# Patient Record
Sex: MALE | Race: WHITE | NOT HISPANIC OR LATINO | Employment: PART TIME | URBAN - METROPOLITAN AREA
[De-identification: names, ages, dates, MRNs, and addresses within clinical notes are randomized per-mention and may not be internally consistent; named-entity substitution may affect disease eponyms.]

---

## 2019-05-06 ENCOUNTER — HOSPITAL ENCOUNTER (INPATIENT)
Facility: CLINIC | Age: 54
LOS: 2 days | Discharge: HOME | DRG: 247 | End: 2019-05-08
Attending: EMERGENCY MEDICINE | Admitting: INTERNAL MEDICINE
Payer: COMMERCIAL

## 2019-05-06 ENCOUNTER — SURGERY (OUTPATIENT)
Age: 54
End: 2019-05-06
Payer: COMMERCIAL

## 2019-05-06 DIAGNOSIS — I21.3 STEMI (ST ELEVATION MYOCARDIAL INFARCTION) (H): ICD-10-CM

## 2019-05-06 DIAGNOSIS — Z98.61 POSTSURGICAL PERCUTANEOUS TRANSLUMINAL CORONARY ANGIOPLASTY STATUS: Primary | ICD-10-CM

## 2019-05-06 DIAGNOSIS — I25.10 CORONARY ARTERY DISEASE INVOLVING NATIVE CORONARY ARTERY OF NATIVE HEART WITHOUT ANGINA PECTORIS: ICD-10-CM

## 2019-05-06 LAB
ANION GAP SERPL CALCULATED.3IONS-SCNC: 9 MMOL/L (ref 3–14)
APTT PPP: 26 SEC (ref 22–37)
BASOPHILS # BLD AUTO: 0.1 10E9/L (ref 0–0.2)
BASOPHILS NFR BLD AUTO: 0.5 %
BUN SERPL-MCNC: 12 MG/DL (ref 7–30)
CALCIUM SERPL-MCNC: 9.3 MG/DL (ref 8.5–10.1)
CHLORIDE SERPL-SCNC: 106 MMOL/L (ref 94–109)
CHOLEST SERPL-MCNC: 249 MG/DL
CO2 SERPL-SCNC: 26 MMOL/L (ref 20–32)
CREAT SERPL-MCNC: 1.17 MG/DL (ref 0.66–1.25)
DIFFERENTIAL METHOD BLD: ABNORMAL
EOSINOPHIL # BLD AUTO: 0.2 10E9/L (ref 0–0.7)
EOSINOPHIL NFR BLD AUTO: 2.1 %
ERYTHROCYTE [DISTWIDTH] IN BLOOD BY AUTOMATED COUNT: 12.2 % (ref 10–15)
GFR SERPL CREATININE-BSD FRML MDRD: 71 ML/MIN/{1.73_M2}
GLUCOSE SERPL-MCNC: 129 MG/DL (ref 70–99)
HCT VFR BLD AUTO: 47.3 % (ref 40–53)
HDLC SERPL-MCNC: 35 MG/DL
HGB BLD-MCNC: 16.8 G/DL (ref 13.3–17.7)
IMM GRANULOCYTES # BLD: 0 10E9/L (ref 0–0.4)
IMM GRANULOCYTES NFR BLD: 0.3 %
INR PPP: 0.93 (ref 0.86–1.14)
INTERPRETATION ECG - MUSE: NORMAL
LDLC SERPL CALC-MCNC: 141 MG/DL
LYMPHOCYTES # BLD AUTO: 4.9 10E9/L (ref 0.8–5.3)
LYMPHOCYTES NFR BLD AUTO: 42.9 %
MCH RBC QN AUTO: 33.5 PG (ref 26.5–33)
MCHC RBC AUTO-ENTMCNC: 35.5 G/DL (ref 31.5–36.5)
MCV RBC AUTO: 94 FL (ref 78–100)
MONOCYTES # BLD AUTO: 0.8 10E9/L (ref 0–1.3)
MONOCYTES NFR BLD AUTO: 7.3 %
NEUTROPHILS # BLD AUTO: 5.4 10E9/L (ref 1.6–8.3)
NEUTROPHILS NFR BLD AUTO: 46.9 %
NONHDLC SERPL-MCNC: 214 MG/DL
NRBC # BLD AUTO: 0 10*3/UL
NRBC BLD AUTO-RTO: 0 /100
PLATELET # BLD AUTO: 230 10E9/L (ref 150–450)
POTASSIUM SERPL-SCNC: 3.6 MMOL/L (ref 3.4–5.3)
RBC # BLD AUTO: 5.02 10E12/L (ref 4.4–5.9)
SODIUM SERPL-SCNC: 141 MMOL/L (ref 133–144)
TRIGL SERPL-MCNC: 363 MG/DL
TROPONIN I SERPL-MCNC: 2.17 UG/L (ref 0–0.04)
TROPONIN I SERPL-MCNC: <0.015 UG/L (ref 0–0.04)
WBC # BLD AUTO: 11.5 10E9/L (ref 4–11)

## 2019-05-06 PROCEDURE — C1874 STENT, COATED/COV W/DEL SYS: HCPCS | Performed by: INTERNAL MEDICINE

## 2019-05-06 PROCEDURE — 25000128 H RX IP 250 OP 636: Performed by: INTERNAL MEDICINE

## 2019-05-06 PROCEDURE — 25000125 ZZHC RX 250: Performed by: INTERNAL MEDICINE

## 2019-05-06 PROCEDURE — 85025 COMPLETE CBC W/AUTO DIFF WBC: CPT | Performed by: EMERGENCY MEDICINE

## 2019-05-06 PROCEDURE — 99153 MOD SED SAME PHYS/QHP EA: CPT | Performed by: INTERNAL MEDICINE

## 2019-05-06 PROCEDURE — 80048 BASIC METABOLIC PNL TOTAL CA: CPT | Performed by: EMERGENCY MEDICINE

## 2019-05-06 PROCEDURE — 25800030 ZZH RX IP 258 OP 636: Performed by: STUDENT IN AN ORGANIZED HEALTH CARE EDUCATION/TRAINING PROGRAM

## 2019-05-06 PROCEDURE — 25000132 ZZH RX MED GY IP 250 OP 250 PS 637: Performed by: PHYSICIAN ASSISTANT

## 2019-05-06 PROCEDURE — 84484 ASSAY OF TROPONIN QUANT: CPT | Performed by: EMERGENCY MEDICINE

## 2019-05-06 PROCEDURE — 93572 IV DOP VEL&/PRESS C FLO EA: CPT | Mod: 26 | Performed by: INTERNAL MEDICINE

## 2019-05-06 PROCEDURE — 93458 L HRT ARTERY/VENTRICLE ANGIO: CPT | Performed by: INTERNAL MEDICINE

## 2019-05-06 PROCEDURE — C9606 PERC D-E COR REVASC W AMI S: HCPCS | Mod: RC | Performed by: INTERNAL MEDICINE

## 2019-05-06 PROCEDURE — 99232 SBSQ HOSP IP/OBS MODERATE 35: CPT | Performed by: INTERNAL MEDICINE

## 2019-05-06 PROCEDURE — 99152 MOD SED SAME PHYS/QHP 5/>YRS: CPT | Performed by: INTERNAL MEDICINE

## 2019-05-06 PROCEDURE — 85730 THROMBOPLASTIN TIME PARTIAL: CPT | Performed by: EMERGENCY MEDICINE

## 2019-05-06 PROCEDURE — 25000132 ZZH RX MED GY IP 250 OP 250 PS 637: Performed by: EMERGENCY MEDICINE

## 2019-05-06 PROCEDURE — 99291 CRITICAL CARE FIRST HOUR: CPT | Mod: 25 | Performed by: INTERNAL MEDICINE

## 2019-05-06 PROCEDURE — 80061 LIPID PANEL: CPT | Performed by: EMERGENCY MEDICINE

## 2019-05-06 PROCEDURE — 85347 COAGULATION TIME ACTIVATED: CPT

## 2019-05-06 PROCEDURE — 25000132 ZZH RX MED GY IP 250 OP 250 PS 637: Performed by: INTERNAL MEDICINE

## 2019-05-06 PROCEDURE — 93454 CORONARY ARTERY ANGIO S&I: CPT | Performed by: INTERNAL MEDICINE

## 2019-05-06 PROCEDURE — 93005 ELECTROCARDIOGRAM TRACING: CPT

## 2019-05-06 PROCEDURE — 4A023N8 MEASUREMENT OF CARDIAC SAMPLING AND PRESSURE, BILATERAL, PERCUTANEOUS APPROACH: ICD-10-PCS | Performed by: INTERNAL MEDICINE

## 2019-05-06 PROCEDURE — 36415 COLL VENOUS BLD VENIPUNCTURE: CPT | Performed by: INTERNAL MEDICINE

## 2019-05-06 PROCEDURE — 99232 SBSQ HOSP IP/OBS MODERATE 35: CPT | Performed by: PHYSICIAN ASSISTANT

## 2019-05-06 PROCEDURE — C1769 GUIDE WIRE: HCPCS | Performed by: INTERNAL MEDICINE

## 2019-05-06 PROCEDURE — 25000132 ZZH RX MED GY IP 250 OP 250 PS 637: Performed by: STUDENT IN AN ORGANIZED HEALTH CARE EDUCATION/TRAINING PROGRAM

## 2019-05-06 PROCEDURE — 92928 PRQ TCAT PLMT NTRAC ST 1 LES: CPT | Mod: LD | Performed by: INTERNAL MEDICINE

## 2019-05-06 PROCEDURE — C1725 CATH, TRANSLUMIN NON-LASER: HCPCS | Performed by: INTERNAL MEDICINE

## 2019-05-06 PROCEDURE — 25000132 ZZH RX MED GY IP 250 OP 250 PS 637

## 2019-05-06 PROCEDURE — 85610 PROTHROMBIN TIME: CPT | Performed by: EMERGENCY MEDICINE

## 2019-05-06 PROCEDURE — 93571 IV DOP VEL&/PRESS C FLO 1ST: CPT | Mod: 26 | Performed by: INTERNAL MEDICINE

## 2019-05-06 PROCEDURE — 027035Z DILATION OF CORONARY ARTERY, ONE ARTERY WITH TWO DRUG-ELUTING INTRALUMINAL DEVICES, PERCUTANEOUS APPROACH: ICD-10-PCS | Performed by: INTERNAL MEDICINE

## 2019-05-06 PROCEDURE — C1887 CATHETER, GUIDING: HCPCS | Performed by: INTERNAL MEDICINE

## 2019-05-06 PROCEDURE — 96374 THER/PROPH/DIAG INJ IV PUSH: CPT

## 2019-05-06 PROCEDURE — 84484 ASSAY OF TROPONIN QUANT: CPT | Performed by: INTERNAL MEDICINE

## 2019-05-06 PROCEDURE — B2111ZZ FLUOROSCOPY OF MULTIPLE CORONARY ARTERIES USING LOW OSMOLAR CONTRAST: ICD-10-PCS | Performed by: INTERNAL MEDICINE

## 2019-05-06 PROCEDURE — C1894 INTRO/SHEATH, NON-LASER: HCPCS | Performed by: INTERNAL MEDICINE

## 2019-05-06 PROCEDURE — 99291 CRITICAL CARE FIRST HOUR: CPT

## 2019-05-06 PROCEDURE — C9600 PERC DRUG-EL COR STENT SING: HCPCS | Performed by: INTERNAL MEDICINE

## 2019-05-06 PROCEDURE — 21000001 ZZH R&B HEART CARE

## 2019-05-06 PROCEDURE — 99207 ZZC CONSULT E&M CHANGED TO SUBSEQUENT LEVEL: CPT | Performed by: PHYSICIAN ASSISTANT

## 2019-05-06 PROCEDURE — 99152 MOD SED SAME PHYS/QHP 5/>YRS: CPT | Mod: GC | Performed by: INTERNAL MEDICINE

## 2019-05-06 PROCEDURE — 27210794 ZZH OR GENERAL SUPPLY STERILE: Performed by: INTERNAL MEDICINE

## 2019-05-06 PROCEDURE — 25000128 H RX IP 250 OP 636: Performed by: EMERGENCY MEDICINE

## 2019-05-06 PROCEDURE — 93010 ELECTROCARDIOGRAM REPORT: CPT | Performed by: INTERNAL MEDICINE

## 2019-05-06 DEVICE — STENT SYNERGY DRUG ELUTING 3.00X12MM  H7493926012300: Type: IMPLANTABLE DEVICE | Status: FUNCTIONAL

## 2019-05-06 DEVICE — STENT SYNERGY DRUG ELUTING 3.00X20MM  H7493926020300: Type: IMPLANTABLE DEVICE | Status: FUNCTIONAL

## 2019-05-06 RX ORDER — NITROGLYCERIN 0.4 MG/1
0.4 TABLET SUBLINGUAL EVERY 5 MIN PRN
Status: DISCONTINUED | OUTPATIENT
Start: 2019-05-06 | End: 2019-05-06

## 2019-05-06 RX ORDER — NITROGLYCERIN 0.4 MG/1
0.4 TABLET SUBLINGUAL EVERY 5 MIN PRN
Status: DISCONTINUED | OUTPATIENT
Start: 2019-05-06 | End: 2019-05-08 | Stop reason: HOSPADM

## 2019-05-06 RX ORDER — FENTANYL CITRATE 50 UG/ML
25-50 INJECTION, SOLUTION INTRAMUSCULAR; INTRAVENOUS
Status: ACTIVE | OUTPATIENT
Start: 2019-05-06 | End: 2019-05-07

## 2019-05-06 RX ORDER — ASPIRIN 81 MG/1
81 TABLET ORAL DAILY
Status: DISCONTINUED | OUTPATIENT
Start: 2019-05-07 | End: 2019-05-08 | Stop reason: HOSPADM

## 2019-05-06 RX ORDER — METOPROLOL TARTRATE 1 MG/ML
5 INJECTION, SOLUTION INTRAVENOUS EVERY 10 MIN PRN
Status: COMPLETED | OUTPATIENT
Start: 2019-05-06 | End: 2019-05-06

## 2019-05-06 RX ORDER — LIDOCAINE 40 MG/G
CREAM TOPICAL
Status: CANCELLED | OUTPATIENT
Start: 2019-05-06

## 2019-05-06 RX ORDER — LISINOPRIL 5 MG/1
5 TABLET ORAL DAILY
Status: DISCONTINUED | OUTPATIENT
Start: 2019-05-06 | End: 2019-05-08 | Stop reason: HOSPADM

## 2019-05-06 RX ORDER — SODIUM CHLORIDE 9 MG/ML
INJECTION, SOLUTION INTRAVENOUS CONTINUOUS
Status: CANCELLED | OUTPATIENT
Start: 2019-05-06

## 2019-05-06 RX ORDER — AMLODIPINE BESYLATE 5 MG/1
5 TABLET ORAL
Status: DISCONTINUED | OUTPATIENT
Start: 2019-05-06 | End: 2019-05-08 | Stop reason: HOSPADM

## 2019-05-06 RX ORDER — ASPIRIN 325 MG
TABLET ORAL
Status: COMPLETED
Start: 2019-05-06 | End: 2019-05-06

## 2019-05-06 RX ORDER — SODIUM CHLORIDE 9 MG/ML
INJECTION, SOLUTION INTRAVENOUS CONTINUOUS
Status: ACTIVE | OUTPATIENT
Start: 2019-05-06 | End: 2019-05-06

## 2019-05-06 RX ORDER — VERAPAMIL HYDROCHLORIDE 2.5 MG/ML
INJECTION, SOLUTION INTRAVENOUS
Status: DISCONTINUED | OUTPATIENT
Start: 2019-05-06 | End: 2019-05-06 | Stop reason: HOSPADM

## 2019-05-06 RX ORDER — NALOXONE HYDROCHLORIDE 0.4 MG/ML
.2-.4 INJECTION, SOLUTION INTRAMUSCULAR; INTRAVENOUS; SUBCUTANEOUS
Status: ACTIVE | OUTPATIENT
Start: 2019-05-06 | End: 2019-05-07

## 2019-05-06 RX ORDER — ASPIRIN 81 MG/1
324 TABLET, CHEWABLE ORAL ONCE
Status: COMPLETED | OUTPATIENT
Start: 2019-05-06 | End: 2019-05-06

## 2019-05-06 RX ORDER — NITROGLYCERIN 0.4 MG/1
TABLET SUBLINGUAL
Status: COMPLETED
Start: 2019-05-06 | End: 2019-05-06

## 2019-05-06 RX ORDER — METOPROLOL TARTRATE 25 MG/1
25 TABLET, FILM COATED ORAL 2 TIMES DAILY
Status: DISCONTINUED | OUTPATIENT
Start: 2019-05-06 | End: 2019-05-08 | Stop reason: HOSPADM

## 2019-05-06 RX ORDER — NALOXONE HYDROCHLORIDE 0.4 MG/ML
.1-.4 INJECTION, SOLUTION INTRAMUSCULAR; INTRAVENOUS; SUBCUTANEOUS
Status: DISCONTINUED | OUTPATIENT
Start: 2019-05-06 | End: 2019-05-08 | Stop reason: HOSPADM

## 2019-05-06 RX ORDER — LORAZEPAM 2 MG/ML
0.5 INJECTION INTRAMUSCULAR
Status: CANCELLED | OUTPATIENT
Start: 2019-05-06

## 2019-05-06 RX ORDER — MORPHINE SULFATE 2 MG/ML
0.5 INJECTION, SOLUTION INTRAMUSCULAR; INTRAVENOUS ONCE
Status: DISCONTINUED | OUTPATIENT
Start: 2019-05-06 | End: 2019-05-08 | Stop reason: HOSPADM

## 2019-05-06 RX ORDER — ATROPINE SULFATE 0.1 MG/ML
0.5 INJECTION INTRAVENOUS EVERY 5 MIN PRN
Status: ACTIVE | OUTPATIENT
Start: 2019-05-06 | End: 2019-05-07

## 2019-05-06 RX ORDER — LORAZEPAM 0.5 MG/1
0.5 TABLET ORAL
Status: CANCELLED | OUTPATIENT
Start: 2019-05-06

## 2019-05-06 RX ORDER — FENTANYL CITRATE 50 UG/ML
INJECTION, SOLUTION INTRAMUSCULAR; INTRAVENOUS
Status: DISCONTINUED | OUTPATIENT
Start: 2019-05-06 | End: 2019-05-06 | Stop reason: HOSPADM

## 2019-05-06 RX ORDER — ZOLPIDEM TARTRATE 5 MG/1
5 TABLET ORAL
Status: DISCONTINUED | OUTPATIENT
Start: 2019-05-06 | End: 2019-05-08 | Stop reason: HOSPADM

## 2019-05-06 RX ORDER — HEPARIN SODIUM 1000 [USP'U]/ML
INJECTION, SOLUTION INTRAVENOUS; SUBCUTANEOUS
Status: DISCONTINUED | OUTPATIENT
Start: 2019-05-06 | End: 2019-05-06 | Stop reason: HOSPADM

## 2019-05-06 RX ORDER — POTASSIUM CHLORIDE 1500 MG/1
20 TABLET, EXTENDED RELEASE ORAL
Status: CANCELLED | OUTPATIENT
Start: 2019-05-06

## 2019-05-06 RX ORDER — FLUMAZENIL 0.1 MG/ML
0.2 INJECTION, SOLUTION INTRAVENOUS
Status: ACTIVE | OUTPATIENT
Start: 2019-05-06 | End: 2019-05-07

## 2019-05-06 RX ORDER — ATORVASTATIN CALCIUM 40 MG/1
40 TABLET, FILM COATED ORAL DAILY
Status: DISCONTINUED | OUTPATIENT
Start: 2019-05-06 | End: 2019-05-08 | Stop reason: HOSPADM

## 2019-05-06 RX ORDER — LISINOPRIL 10 MG/1
10 TABLET ORAL ONCE
Status: COMPLETED | OUTPATIENT
Start: 2019-05-06 | End: 2019-05-06

## 2019-05-06 RX ORDER — NITROGLYCERIN 5 MG/ML
VIAL (ML) INTRAVENOUS
Status: DISCONTINUED | OUTPATIENT
Start: 2019-05-06 | End: 2019-05-06 | Stop reason: HOSPADM

## 2019-05-06 RX ORDER — ACETAMINOPHEN 325 MG/1
325-650 TABLET ORAL EVERY 4 HOURS PRN
Status: DISCONTINUED | OUTPATIENT
Start: 2019-05-06 | End: 2019-05-08 | Stop reason: HOSPADM

## 2019-05-06 RX ORDER — HYDROCODONE BITARTRATE AND ACETAMINOPHEN 5; 325 MG/1; MG/1
1-2 TABLET ORAL EVERY 4 HOURS PRN
Status: DISCONTINUED | OUTPATIENT
Start: 2019-05-06 | End: 2019-05-08 | Stop reason: HOSPADM

## 2019-05-06 RX ADMIN — TICAGRELOR 180 MG: 90 TABLET ORAL at 15:25

## 2019-05-06 RX ADMIN — FENTANYL CITRATE 50 MCG: 50 INJECTION, SOLUTION INTRAMUSCULAR; INTRAVENOUS at 15:35

## 2019-05-06 RX ADMIN — ASPIRIN 324 MG: 81 TABLET, CHEWABLE ORAL at 15:20

## 2019-05-06 RX ADMIN — MIDAZOLAM 1 MG: 1 INJECTION INTRAMUSCULAR; INTRAVENOUS at 15:40

## 2019-05-06 RX ADMIN — HEPARIN SODIUM 3000 UNITS: 1000 INJECTION, SOLUTION INTRAVENOUS; SUBCUTANEOUS at 15:38

## 2019-05-06 RX ADMIN — VERAPAMIL HYDROCHLORIDE 2.5 MG: 2.5 INJECTION, SOLUTION INTRAVENOUS at 15:35

## 2019-05-06 RX ADMIN — NITROGLYCERIN 0.4 MG: 0.4 TABLET SUBLINGUAL at 15:22

## 2019-05-06 RX ADMIN — METOPROLOL TARTRATE 25 MG: 25 TABLET ORAL at 21:07

## 2019-05-06 RX ADMIN — ZOLPIDEM TARTRATE 5 MG: 5 TABLET, FILM COATED ORAL at 23:22

## 2019-05-06 RX ADMIN — NITROGLYCERIN 200 MCG: 5 INJECTION, SOLUTION INTRAVENOUS at 15:45

## 2019-05-06 RX ADMIN — ATORVASTATIN CALCIUM 40 MG: 40 TABLET, FILM COATED ORAL at 18:27

## 2019-05-06 RX ADMIN — FENTANYL CITRATE 50 MCG: 50 INJECTION, SOLUTION INTRAMUSCULAR; INTRAVENOUS at 15:50

## 2019-05-06 RX ADMIN — MIDAZOLAM 2 MG: 1 INJECTION INTRAMUSCULAR; INTRAVENOUS at 15:45

## 2019-05-06 RX ADMIN — LISINOPRIL 5 MG: 5 TABLET ORAL at 18:27

## 2019-05-06 RX ADMIN — MIDAZOLAM 1 MG: 1 INJECTION INTRAMUSCULAR; INTRAVENOUS at 15:35

## 2019-05-06 RX ADMIN — LIDOCAINE HYDROCHLORIDE 1 ML: 10 INJECTION, SOLUTION EPIDURAL; INFILTRATION; INTRACAUDAL; PERINEURAL at 15:35

## 2019-05-06 RX ADMIN — HEPARIN SODIUM 5500 UNITS: 1000 INJECTION, SOLUTION INTRAVENOUS; SUBCUTANEOUS at 15:24

## 2019-05-06 RX ADMIN — SODIUM CHLORIDE: 9 INJECTION, SOLUTION INTRAVENOUS at 16:22

## 2019-05-06 RX ADMIN — ASPIRIN 325 MG ORAL TABLET 324 MG: 325 PILL ORAL at 15:20

## 2019-05-06 RX ADMIN — METOPROLOL TARTRATE 5 MG: 5 INJECTION, SOLUTION INTRAVENOUS at 22:16

## 2019-05-06 RX ADMIN — HEPARIN SODIUM 4000 UNITS: 1000 INJECTION, SOLUTION INTRAVENOUS; SUBCUTANEOUS at 15:43

## 2019-05-06 RX ADMIN — METOPROLOL TARTRATE 5 MG: 5 INJECTION, SOLUTION INTRAVENOUS at 21:46

## 2019-05-06 RX ADMIN — LISINOPRIL 10 MG: 10 TABLET ORAL at 21:47

## 2019-05-06 RX ADMIN — FENTANYL CITRATE 50 MCG: 50 INJECTION, SOLUTION INTRAMUSCULAR; INTRAVENOUS at 15:40

## 2019-05-06 RX ADMIN — NITROGLYCERIN 200 MCG: 5 INJECTION, SOLUTION INTRAVENOUS at 15:35

## 2019-05-06 ASSESSMENT — ACTIVITIES OF DAILY LIVING (ADL)
TOILETING: 0-->INDEPENDENT
BATHING: 0-->INDEPENDENT
RETIRED_EATING: 0-->INDEPENDENT
SWALLOWING: 0-->SWALLOWS FOODS/LIQUIDS WITHOUT DIFFICULTY
RETIRED_COMMUNICATION: 0-->UNDERSTANDS/COMMUNICATES WITHOUT DIFFICULTY
DRESS: 0-->INDEPENDENT

## 2019-05-06 ASSESSMENT — MIFFLIN-ST. JEOR
SCORE: 1676.79
SCORE: 1670

## 2019-05-06 ASSESSMENT — ENCOUNTER SYMPTOMS: FEVER: 0

## 2019-05-06 NOTE — Clinical Note
The first balloon was inserted into the right coronary artery.Max pressure = 13 lai. Total duration = 11 seconds.

## 2019-05-06 NOTE — PHARMACY-ADMISSION MEDICATION HISTORY
Admission medication history interview status for the 5/6/2019  admission is complete. See EPIC admission navigator for prior to admission medications     Medication history source reliability:Good    Actions taken by pharmacist (provider contacted, etc):None     Additional medication history information not noted on PTA med list :None    Medication reconciliation/reorder completed by provider prior to medication history? No    Time spent in this activity: 3 min    Prior to Admission medications    Not on File

## 2019-05-06 NOTE — Clinical Note
The first balloon was inserted into the right coronary artery.Max pressure = 20 lai. Total duration = 15 seconds.     Max pressure = 22 lai. Total duration = 16 seconds.    Balloon reinflated a second time: Max pressure = 22 lai. Total duration = 16 seconds.

## 2019-05-06 NOTE — PROGRESS NOTES
[facility] Cardiology Progress Note    60 yo man with history CAD, prior PCI's was playing football and experienced chest pain at 1300.  In ED, triaged to heart cath lab for inferior STEMI.  RCA occluded; 2 stents placed.         Assessment and Plan:   Assessment:   1. Inferior STEMI - Chest pain at 1300, back from cath lab by about 1600.  Pain-free, EKG has essentially normalized after stenting (2) of RCA.  Will need staged intervention on proximal circumflex.  2. CAD - prior PCI, will need aggressive risk intervention.      Plan:   As above.  Trend troponins.  Echo.  DAPT.  Statin.  Beta blockade, ACEI.  Trend troponins.  Cardiac rehab.  Stop smoking.            Significant Problems:     Patient Active Problem List    Diagnosis Date Noted     STEMI (ST elevation myocardial infarction) (H) 05/06/2019     Priority: Medium             Subjective:     No chest pain now.          Medications:   Scheduled:    aspirin  81 mg Oral Daily     atorvastatin  40 mg Oral Daily     lisinopril  5 mg Oral Daily     metoprolol tartrate  25 mg Oral BID     ticagrelor  90 mg Oral Q12H            Physical Exam:   All vitals have been reviewed  Temp:  [98.4  F (36.9  C)] 98.4  F (36.9  C)  Heart Rate:  [86-90] 90  Resp:  [16-28] 28  BP: (147-190)/(100-110) 147/100  SpO2:  [98 %-99 %] 99 %  Vitals:    05/06/19 1509 05/06/19 1519   Weight: 79.4 kg (175 lb) 78.7 kg (173 lb 8 oz)     No intake/output data recorded.  NAD, alert and oriented X 3.    Skin: warm and dry.    Head: normocephalic.    Neck:   No JVD.     Lungs:   Clear without wheezes, rhonchi.     Cardiovascular:   S1, S2, regular, no murmur.     Ext's: no edema.          Data:   Last Basic Metabolic Panel:  Lab Results   Component Value Date     05/06/2019      Lab Results   Component Value Date    POTASSIUM 3.6 05/06/2019     Lab Results   Component Value Date    CHLORIDE 106 05/06/2019     Lab Results   Component Value Date    CHRISTINE 9.3 05/06/2019     Lab Results    Component Value Date    CO2 26 05/06/2019     Lab Results   Component Value Date    BUN 12 05/06/2019     Lab Results   Component Value Date    CR 1.17 05/06/2019     Lab Results   Component Value Date     05/06/2019          Lab Results   Component Value Date    WBC 11.5 (H) 05/06/2019    HGB 16.8 05/06/2019    HCT 47.3 05/06/2019    MCV 94 05/06/2019     05/06/2019     Lab Results   Component Value Date    INR 0.93 05/06/2019     Lab Results   Component Value Date    TROPI <0.015 05/06/2019         Ginger Booth MD  5/6/2019

## 2019-05-06 NOTE — H&P
Admitted:     05/06/2019      HISTORY OF PRESENT ILLNESS:  Mr. Lacy is a pleasant 54-year-old gentleman who has a past medical history significant for coronary artery disease with the last stent about 8 years ago at [facility].  He had 1 prior stent before that.  He has risk factors including family history, nicotine dependence ongoing, and not taking his medications.  Today, he was playing football when he had the onset of chest pain radiating to his left arm up into his jaw.  He presented to [facility] where he was found to have an inferior STEMI and was taken urgently to the Cath Lab.      ALLERGIES:  NO KNOWN DRUG ALLERGIES.      MEDICATIONS:  Not taking any medications.      PAST MEDICAL HISTORY:  MIs as outlined.      SURGICAL HISTORY:  Stents.      FAMILY HISTORY:  Positive for early coronary artery disease.      SOCIAL HISTORY:  He is single, currently smokes, says he quit 2 days ago.      REVIEW OF SYSTEMS:  Negative beyond that noted in the HPI.      PHYSICAL EXAMINATION:   VITAL SIGNS:  His blood pressure is 190/110, afebrile, heart rate 86, satting 98%.   GENERAL:  He is a male, lying in bed, in no acute distress other than clutching his chest.   LUNGS:  Bilaterally clear.   ABDOMEN:  Soft, nontender.   EXTREMITIES:  No clubbing, cyanosis or edema.   VASCULAR:  No elevation of his JVP.   PSYCHIATRIC:  Normal affect.   SKIN:  No rashes.   HEENT:  Reasonable dentition.  No icterus.      LABORATORY:  His creatinine 1.17, glucose 129, hemoglobin 16.8.  Troponins not back.      ASSESSMENT AND PLAN:     1.  Inferior ST elevation myocardial infarction.   2.  Ongoing nicotine dependence.   3.  Probable hypertension and hyperlipidemia, not on treatment.      DISCUSSION:  It is a pleasure to be involved in the care of Mr. Lacy.  He presented with an inferior STEMI and got 2 drug-eluting stents to the RCA.  He has residual disease in the circumflex artery that will need to be staged at a  later time.  He was given Brilinta in the emergency room and heparin.  We will have him admitted to the Hospitalist Service at least 2 hospital days stay under the care of the hospitalist team and Dr. Booth from Cardiology will be following.      It was a pleasure being involved in his care.  It is be important for him to stop smoking and stay on his medications.      CRITICAL CARE TIME:   30 minutes critical care time, separate from the procedure.         YASSINE RODRÍGUEZ MD             D: 2019   T: 2019   MT:       Name:     SULTANA VARGAS   MRN:      [id number]        Account:      [id number]   :      1965        Admitted:     2019                   Document: [id number]

## 2019-05-06 NOTE — Clinical Note
Max pressure = 12 lai. Total duration = 15 seconds. Balloon reinflated a second time: Max pressure = 18 lai. Total duration = 24 seconds.

## 2019-05-06 NOTE — ED NOTES
1525 - Patient to CV lab with zoll pads, full cardiac monitor.  RN & ERT accompany.  Patient report & cares to CV lab RN & team.

## 2019-05-06 NOTE — ED PROVIDER NOTES
History     Chief Complaint:  Chest Pain    The history is provided by the patient.      Matty Lacy is a 54 year old male who presents with chest pain. The patient was playing catch with a football about 30-45 minutes ago when he had onset of chest pain that radiates to his left arm. He rates his pain at a 8/10. He has a history of 2 prior heart attacks and states the pain is similar. He has not taken any nitro.    Allergies:  No known drug allergies.    Medications:    The patient is not currently taking any prescribed medications.     Past Medical History:    Myocardial infarction    Past Surgical History:    Stent    Family History:    History reviewed. No pertinent family history.    Social History:  Patient is single  Tobacco Use: Current smoker  Alcohol Use: Yes  PCP: No primary care provider on file.     Review of Systems   Constitutional: Negative for fever.   Cardiovascular: Positive for chest pain.   All other systems reviewed and are negative.    Physical Exam   First Vitals:  Patient Vitals for the past 24 hrs:   BP Temp Temp src Heart Rate Resp SpO2 Height Weight   05/06/19 1519 -- -- -- -- -- -- 1.829 m (6') 78.7 kg (173 lb 8 oz)   05/06/19 1509 (!) 190/110 98.4  F (36.9  C) Oral 86 16 98 % 1.829 m (6') 79.4 kg (175 lb)     Physical Exam  Constitutional: The patient is oriented to person, place, and time.   HENT:   Head: Atraumatic  Right Ear: Normal  Left Ear: Normal  Nose: Nose normal.   Mouth/Throat: Oropharynx is clear and moist. No erythema or exudate.   Eyes: Conjunctivae and EOM are normal. Pupils are equal, round, and reactive to light. No discharge  Neck: Normal range of motion. Neck supple.   Cardiovascular: Normal rate, regular rhythm, no murmur gallops or rubs. Intact distal pulses.    Pulmonary/Chest: CTA bilaterally. No wheezes rale or rhonchi.  Abdominal: Soft. Non tender.  No masses   Musculoskeletal: No edema. No bony deformity. Normal range of motion  Lymphadenopathy:      The patient has no cervical adenopathy.   Neurological: The patient is alert and oriented to person, place, and time. The patient has normal strength and normal reflexes. No cranial nerve deficit. Coordination normal.   Skin: Skin is warm and dry. No rash noted. The patient is not diaphoretic. Pale.  Psychiatric: The patient has a normal mood and affect.    Emergency Department Course   ECG:  @ 1508  Indication: Chest pain  Vent. Rate 82 bpm. KS interval 130 ms. QRS duration 78 ms. QT/QTc 382/446 ms. P-R-T axis 51 39 94.   Normal sinus rhythm. ST elevation, consider inferior injury or acute infarct. ** ** Acute MI/STEMI ** ** Consider right ventricular involvement in acute inferior infarct. Abnormal ECG.    Read @ 1512 by Dr. Dillard.    Laboratory:  CBC:  WBC 11.5 high, HGB 16.8,   BMP: Glucose 129 high o/w WNL. (Creatinine 1.17)  Troponin: <0.015  PTT: 26  INR: 0.93    Interventions:  1520: Aspirin 324mg PO  1522: Nitrostat 0.4mg Sublingual   1524: Heparin 5,500 units IV  1525: Brilinta 180mg PO    Emergency Department Course:  3:14 PM Nursing notes and vitals reviewed.  I performed an exam of the patient as documented above.     3:16 PM I consulted with Dr. Epstein of cardiology regarding the patient who will bring the patient to the Cath lab.    3:25 PM The patient went to the Cath lab.    Impression & Plan      CMS Diagnoses: The patient has a STEMI     The patient was evaluated at 3:14 PM   Patient was transferred  to the cath lab which was activated due to ECG changes.   ASA given in the ER  Medical Decision Making:  Matty Lacy is a 54 year old male who presents with a half hour of mid sternal chest pain. EKG shows acute inferior myocardial infarction. Patient arrived in the stab rooms and I immediately activated the cath lab. I spoke to Dr. Epstein from the interventionist service. The patient was given a single nitroglycerin, aspirin, heparin bolus of 5,500 units, and brilinta. Cath lab was ready  within about 5-10 minutes of my call and he was transported immediately for definitive care.     Diagnosis:    ICD-10-CM    1. Postsurgical percutaneous transluminal coronary angioplasty status Z98.61 CARDIAC REHAB REFERRAL   2. STEMI (ST elevation myocardial infarction) (H) I21.3 CBC with platelets differential     INR     Partial thromboplastin time     Basic metabolic panel     Troponin I     Disposition:  Patient sent to cath lab.      I, Bradley Aasen, am serving as a scribe on 5/6/2019 at 3:14 PM to personally document services performed by Lloyd Dillard MD based on my observations and the provider's statements to me.          Lloyd Dillard MD  05/06/19 5153

## 2019-05-07 ENCOUNTER — APPOINTMENT (OUTPATIENT)
Dept: OCCUPATIONAL THERAPY | Facility: CLINIC | Age: 54
DRG: 247 | End: 2019-05-07
Attending: STUDENT IN AN ORGANIZED HEALTH CARE EDUCATION/TRAINING PROGRAM
Payer: COMMERCIAL

## 2019-05-07 ENCOUNTER — APPOINTMENT (OUTPATIENT)
Dept: CARDIOLOGY | Facility: CLINIC | Age: 54
DRG: 247 | End: 2019-05-07
Attending: STUDENT IN AN ORGANIZED HEALTH CARE EDUCATION/TRAINING PROGRAM
Payer: COMMERCIAL

## 2019-05-07 LAB
ANION GAP SERPL CALCULATED.3IONS-SCNC: 4 MMOL/L (ref 3–14)
BUN SERPL-MCNC: 13 MG/DL (ref 7–30)
CALCIUM SERPL-MCNC: 9.3 MG/DL (ref 8.5–10.1)
CHLORIDE SERPL-SCNC: 110 MMOL/L (ref 94–109)
CO2 SERPL-SCNC: 28 MMOL/L (ref 20–32)
CREAT SERPL-MCNC: 1.16 MG/DL (ref 0.66–1.25)
ERYTHROCYTE [DISTWIDTH] IN BLOOD BY AUTOMATED COUNT: 12.4 % (ref 10–15)
GFR SERPL CREATININE-BSD FRML MDRD: 71 ML/MIN/{1.73_M2}
GLUCOSE SERPL-MCNC: 94 MG/DL (ref 70–99)
HCT VFR BLD AUTO: 44.9 % (ref 40–53)
HGB BLD-MCNC: 15.8 G/DL (ref 13.3–17.7)
KCT BLD-ACNC: 221 SEC (ref 75–150)
MCH RBC QN AUTO: 33.3 PG (ref 26.5–33)
MCHC RBC AUTO-ENTMCNC: 35.2 G/DL (ref 31.5–36.5)
MCV RBC AUTO: 95 FL (ref 78–100)
PLATELET # BLD AUTO: 214 10E9/L (ref 150–450)
POTASSIUM SERPL-SCNC: 4.9 MMOL/L (ref 3.4–5.3)
RBC # BLD AUTO: 4.74 10E12/L (ref 4.4–5.9)
SODIUM SERPL-SCNC: 142 MMOL/L (ref 133–144)
TROPONIN I SERPL-MCNC: 3.23 UG/L (ref 0–0.04)
TROPONIN I SERPL-MCNC: 3.97 UG/L (ref 0–0.04)
TROPONIN I SERPL-MCNC: 6.92 UG/L (ref 0–0.04)
WBC # BLD AUTO: 10.5 10E9/L (ref 4–11)

## 2019-05-07 PROCEDURE — 80048 BASIC METABOLIC PNL TOTAL CA: CPT | Performed by: STUDENT IN AN ORGANIZED HEALTH CARE EDUCATION/TRAINING PROGRAM

## 2019-05-07 PROCEDURE — 97535 SELF CARE MNGMENT TRAINING: CPT | Mod: GO | Performed by: OCCUPATIONAL THERAPIST

## 2019-05-07 PROCEDURE — 25000132 ZZH RX MED GY IP 250 OP 250 PS 637: Performed by: STUDENT IN AN ORGANIZED HEALTH CARE EDUCATION/TRAINING PROGRAM

## 2019-05-07 PROCEDURE — 97165 OT EVAL LOW COMPLEX 30 MIN: CPT | Mod: GO | Performed by: OCCUPATIONAL THERAPIST

## 2019-05-07 PROCEDURE — 84484 ASSAY OF TROPONIN QUANT: CPT | Performed by: INTERNAL MEDICINE

## 2019-05-07 PROCEDURE — 25000132 ZZH RX MED GY IP 250 OP 250 PS 637: Performed by: PHYSICIAN ASSISTANT

## 2019-05-07 PROCEDURE — 99232 SBSQ HOSP IP/OBS MODERATE 35: CPT | Performed by: INTERNAL MEDICINE

## 2019-05-07 PROCEDURE — 36415 COLL VENOUS BLD VENIPUNCTURE: CPT | Performed by: INTERNAL MEDICINE

## 2019-05-07 PROCEDURE — 93306 TTE W/DOPPLER COMPLETE: CPT | Mod: 26 | Performed by: INTERNAL MEDICINE

## 2019-05-07 PROCEDURE — 25500064 ZZH RX 255 OP 636: Performed by: INTERNAL MEDICINE

## 2019-05-07 PROCEDURE — 36415 COLL VENOUS BLD VENIPUNCTURE: CPT | Performed by: STUDENT IN AN ORGANIZED HEALTH CARE EDUCATION/TRAINING PROGRAM

## 2019-05-07 PROCEDURE — 99232 SBSQ HOSP IP/OBS MODERATE 35: CPT | Performed by: PHYSICIAN ASSISTANT

## 2019-05-07 PROCEDURE — 97110 THERAPEUTIC EXERCISES: CPT | Mod: GO | Performed by: OCCUPATIONAL THERAPIST

## 2019-05-07 PROCEDURE — 85027 COMPLETE CBC AUTOMATED: CPT | Performed by: STUDENT IN AN ORGANIZED HEALTH CARE EDUCATION/TRAINING PROGRAM

## 2019-05-07 PROCEDURE — 84484 ASSAY OF TROPONIN QUANT: CPT | Performed by: STUDENT IN AN ORGANIZED HEALTH CARE EDUCATION/TRAINING PROGRAM

## 2019-05-07 PROCEDURE — 21000001 ZZH R&B HEART CARE

## 2019-05-07 PROCEDURE — 40000264 ECHOCARDIOGRAM COMPLETE

## 2019-05-07 RX ADMIN — HUMAN ALBUMIN MICROSPHERES AND PERFLUTREN 6 ML: 10; .22 INJECTION, SOLUTION INTRAVENOUS at 10:07

## 2019-05-07 RX ADMIN — ATORVASTATIN CALCIUM 40 MG: 40 TABLET, FILM COATED ORAL at 08:22

## 2019-05-07 RX ADMIN — ZOLPIDEM TARTRATE 5 MG: 5 TABLET, FILM COATED ORAL at 21:31

## 2019-05-07 RX ADMIN — TICAGRELOR 90 MG: 90 TABLET ORAL at 04:30

## 2019-05-07 RX ADMIN — METOPROLOL TARTRATE 25 MG: 25 TABLET ORAL at 21:31

## 2019-05-07 RX ADMIN — ASPIRIN 81 MG: 81 TABLET, COATED ORAL at 08:22

## 2019-05-07 RX ADMIN — TICAGRELOR 90 MG: 90 TABLET ORAL at 15:49

## 2019-05-07 RX ADMIN — METOPROLOL TARTRATE 25 MG: 25 TABLET ORAL at 08:22

## 2019-05-07 RX ADMIN — LISINOPRIL 5 MG: 5 TABLET ORAL at 08:22

## 2019-05-07 ASSESSMENT — MIFFLIN-ST. JEOR: SCORE: 1684.05

## 2019-05-07 ASSESSMENT — ACTIVITIES OF DAILY LIVING (ADL)
ADLS_ACUITY_SCORE: 10
PREVIOUS_RESPONSIBILITIES: WORK;DRIVING;FINANCES;MEDICATION MANAGEMENT;SHOPPING;LAUNDRY;HOUSEKEEPING;MEAL PREP

## 2019-05-07 NOTE — PLAN OF CARE
Discharge Planner OT   Patient plan for discharge: home  Current status: Eval completed and treatment initiated. Pt lives in an apt alone with approx 7 steps to enter into apt. Pt works full time and does some traveling. Pt I with ADL/IADL's. Pt admitted due to STEMI, s/p angio with PCI with DESx2 to RCA and recommended staged procedure to Circumflex. Pt I with ADL's and functional mobility. Pt ambulated x2 50 ft and completed TDM at gradual increase to 2.2 mph for 12.5 mins and completed 15 stairs, pt reports no adverse symptoms and BP blunted, see vital sign flow sheet for details. Pt reports he may not attend OP CR again, he does not have insurance and if he did he's already been through it, pt encouraged to attend at least 1-2 session if able and may want to go to [facility] vs [facility] as [facility] is closer to work. Pt educated in CAD risk factors, pt reports he knows he needs to stop smoking, and reports he walks everywhere vs drive at home in [place] [facility] [place] and feels he eats OK, educated in low cholesterol and low sodium diet and exercise. Pt reports that he's 10/10 motivated and confident he'll quit smoking and has done it before and having a heart attack is even more of a motivator to quit.   2nd session. Pt completed TDM with gradual increase to 2.5 mph for 15 mins with no symptoms reported and BP hypotensive response to exercise. Pt denies any dizziness/lightheadedness.   Barriers to return to prior living situation: none with A with heavier IADL's ie driving per MD ie vacuuming, etc.   Recommendations for discharge: home with OP CR at [facility] and not [facility], if pt willing to attend, encouraged to do so.   Rationale for recommendations: OP CR for monitored progressive exercise and risk factor education and modification.          Entered by: Cathryn Da Silva 05/07/2019 11:35 AM

## 2019-05-07 NOTE — PLAN OF CARE
Pt A/O. VSS. Up ind. Tele shows sr. Pt denies any CP or SOB. 2 stents to RCA through left wrist. Site CDI. Fluids running. Pt has no new complaints.    BP (!) 151/101   Pulse 80   Temp 98.4  F (36.9  C) (Oral)   Resp 17   Ht 1.829 m (6')   Wt 78.7 kg (173 lb 8 oz)   SpO2 100%   BMI 23.53 kg/m

## 2019-05-07 NOTE — CONSULTS
Medication coverage check for Brilinta. Patient has no insurance. Brilinta would be fairly expensive for him going forward, where clopidogrel would be about $15 monthly.  [facility]unn [facility]  Liaison Cell: [phone]

## 2019-05-07 NOTE — PLAN OF CARE
Pt A/O. VSS. Up ind. Tele shows sr. Pt denies any CP or SOB. Transferred to [facility], report given. Possible discharge tomorrow.       /76   Pulse 68   Temp 98  F (36.7  C) (Oral)   Resp 16   Ht 1.829 m (6')   Wt 80.1 kg (176 lb 9.6 oz)   SpO2 98%   BMI 23.95 kg/m

## 2019-05-07 NOTE — CONSULTS
NUTRITION EDUCATION    REASON FOR ASSESSMENT:  Nutrition education on American Heart Association (AHA) Heart Healthy Diet.    NUTRITION HISTORY:  Information obtained from patient, EMR.   - Presented with chest pain, requiring angio and stents. PMH includes 2 past stents, last being 8 years ago.     - Denies heart healthy diet education, at least in the recent past. He is able to discuss pertinent nutrition information during visit.  (trans fat, cholesterol, CHO, sat fat.   - Cooks for himself, occasionally from scratch.   - Has been through cardiac rehab in the past, does not plan on participating this time around.     Diet Recall: 2-3 meals daily   - breakfast: oatmeal, cereal. Goes in spurts. May not eat breakfast for a week, then start eating consistently.   - lunch: fast food during work break, ham sandwich, leftovers from dinner.  - dinner: spaghetti (homemade sauce), pork chops, potatoes, veggies, rice with salmon, soup.  - NKFA      CURRENT DIET ORDER:  Na <2400 mg, LSF    NUTRITION DIAGNOSIS:  Food- and nutrition-related knowledge deficit R/t heart healthy diet ed AEB pt verbalizes no recent formal diet education.      INTERVENTIONS:  Nutrition Prescription:    Recommended AHA Heart Healthy Diet    Implementation:     Nutrition Education (Content):  a) reviewed Heart Healthy Diet guidelines  b) provided heart healthy diet handout    Nutrition Education (Application):  a) Discussed current eating habits and recommended alternative food choices  b) Sub food from home for fast food at lunch  c) Transition to whole grains  d) Include 1 fruit or veg at each meal    Anticipate fair - good compliance    Diet Education - refer to Education flowsheet    Goals:    Patient verbalizes understanding of diet     All of the above goals met during education session    Follow Up/Monitoring:    Provided RD contact information for future questions    Recommended attendance at cardiac rehab for additional nutrition information  and support.     Dorothea Carr RD, LD  3rd floor/ICU: [phone]  All other floors: [phone]  Weekend/holiday: [phone]  Office: [phone]

## 2019-05-07 NOTE — PROVIDER NOTIFICATION
MD Notification    Notified Person: MD    Notified Person Name:Dr Meza    Notification Date/Time: 5/7/19 1200    Notification Interaction: paged    Purpose of Notification:Updated on blood pressures lower on left arm vs right arm.  Pt asymptomatic.    Orders Received:    Comments:

## 2019-05-07 NOTE — CONSULTS
SW:  D:  Reviewed chart.  Patient has no insurance.  Call placed and message left for Rubens in the financial office to follow up with patient directly regarding insurance concerns.  Referral made to the discharge pharmacy liaison for discharge medication assistance.    P:  Will continue to follow.

## 2019-05-07 NOTE — PLAN OF CARE
A&Ox4.  VSS.  Denied pain.  Tele SR.  Independent.  L radial site bandaid c/d/I, CMS intact, +2 pulse.

## 2019-05-07 NOTE — PROGRESS NOTES
[facility]    Medicine Progress Note - Hospitalist Service       Date of Admission:  5/6/2019  Assessment & Plan        Matty Lacy is a pleasant 54-year-old male with a past medical history of coronary artery disease and stents who is admitted for a STEMIt.     H/o CAD with previous STEMI x 2 and stent to RCA (2008) and Circumflex (2010)  Inferior STEMI: He was found to have a RCA occlusion and got 2 drug-eluting stents.  He has residual disease in the circumflex artery that will need to be intervened on at a later time.   - Continue Brilinta, BB, ACE, BB and statin   - Echo pending  - Cardiology following  - Staged intervention per Cardiology    Hypertension: BP elevated on arrival. Previously treated with Metoprolol  - Continue Metoprolol 25 mg bid  - Lisinopril 5 mg/d  - Monitor and adjust as indicated    Hyperlipidemia: Cholesterol 249, , Triglycerides  - Lipitor 40 mg/d    Tobacco use disorder:  The patient reports quitting 2 days ago, coincidently.  Smoking cessation was encouraged.    - Declined need for nicotine replacement     Diet: Combination Diet Low Saturated Fat Diet    DVT Prophylaxis: ASA, Brilinta and PCP  Bailey Catheter: not present  Code Status: Full Code      Disposition Plan   Expected discharge: Per Cardiology recommendations an pending timing of staged intervention    Entered: Jennie Loza PA-C 05/07/2019, 10:26 AM       The patient's care was discussed with the Patient.    Jennie Loza PA-C  Hospitalist Service  [facility]    ______________________________________________________________________    Interval History   Doing well. Denies chest pain or SOB.     Data reviewed today: I reviewed all medications, new labs and imaging results over the last 24 hours. I personally reviewed no images or EKG's today.    Physical Exam   Vital Signs: Temp: 97.8  F (36.6  C) Temp src: Axillary BP: 136/88 Pulse: 68 Heart Rate: 87 Resp: 16 SpO2: 100 %  O2 Device: None (Room air)    Weight: 176 lbs 9.6 oz  Constitutional: Alert, resting comfortably in NAD  Respiratory: Normal effort, symmetric expansion, no crackles or wheezing  Cardiovascular: RRR no murmurs   GI: Non distended, normal bowels sounds, no tenderness or guarding  MSK: LE without edema. Dorsalis pedis pulse palpated bilaterally.   Skin/Integumen: Clear  Neuro: CN II-XII grossly intact  Psych:  Alert and oriented x 3. Normal affect      Data   Recent Labs   Lab 05/07/19  0625 05/06/19  1758 05/06/19  1520   WBC 10.5  --  11.5*   HGB 15.8  --  16.8   MCV 95  --  94     --  230   INR  --   --  0.93     --  141   POTASSIUM 4.9  --  3.6   CHLORIDE 110*  --  106   CO2 28  --  26   BUN 13  --  12   CR 1.16  --  1.17   ANIONGAP 4  --  9   CHRISTINE 9.3  --  9.3   GLC 94  --  129*   TROPI 6.922* 2.175* <0.015     No results found for this or any previous visit (from the past 24 hour(s)).

## 2019-05-07 NOTE — PROGRESS NOTES
05/07/19 1046   Quick Adds   Type of Visit Initial Occupational Therapy Evaluation   Living Environment   Lives With alone   Living Arrangements apartment   Home Accessibility stairs to enter home   Number of Stairs, Main Entrance 7   Transportation Anticipated car, drives self   Self-Care   Regular Exercise   (walks alot in [place] area)   Functional Level   Ambulation 0-->independent   Transferring 0-->independent   Toileting 0-->independent   Bathing 0-->independent   Dressing 0-->independent   Eating 0-->independent   Communication 0-->understands/communicates without difficulty   Swallowing 0-->swallows foods/liquids without difficulty   Cognition 0 - no cognition issues reported   Fall history within last six months no   Prior Functional Level 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 Pt travels and is scheduled to travel to [place] May 20th.    General Information   Onset of Illness/Injury or Date of Surgery - Date 05/06/19   Referring Physician Sai Blevins MD   Patient/Family Goals Statement home   Additional Occupational Profile Info/Pertinent History of Current Problem STEMI, s/p angio with PCI to RCA x 2 and staged procedure planned for circumflex, troponin peaked 6.927   Precautions/Limitations   (MI precautions)   Heart Disease Risk Factors Smoking;Gender;Medical history;Stress;High blood pressure;Dislipidemia;Family history   Cognitive Status Examination   Orientation orientation to person, place and time   Level of Consciousness alert   Follows Commands (Cognition) WNL   Memory intact   Attention No deficits were identified   Organization/Problem Solving No deficits were identified   Executive Function No deficits were identified   Sensory Examination   Sensory Quick Adds No deficits were identified   Pain Assessment   Patient Currently in Pain No   Transfer Skills   Transfer Comments Pt I with ADL's and functional mobility.    Instrumental Activities of Daily Living (IADL)  "  Previous Responsibilities work;driving;finances;medication management;shopping;laundry;housekeeping;meal prep   Activities of Daily Living Analysis   Impairments Contributing to Impaired Activities of Daily Living post surgical precautions  (MI precautions)   General Therapy Interventions   Planned Therapy Interventions home program guidelines;progressive activity/exercise;risk factor education   Clinical Impression   Criteria for Skilled Therapeutic Interventions Met yes, treatment indicated   OT Diagnosis decreased IADls   Influenced by the following impairments MI precautions   Assessment of Occupational Performance 1-3 Performance Deficits   Identified Performance Deficits impaired IADl's, vacuuming, driving per MD, work, etc   Clinical Decision Making (Complexity) Low complexity   Therapy Frequency 2 times/day   Predicted Duration of Therapy Intervention (days/wks) 3 days   Anticipated Discharge Disposition Home with Outpatient Therapy  (OP CR at [facility])   Risks and Benefits of Treatment have been explained. Yes   Patient, Family & other staff in agreement with plan of care Yes   [facility] AM-PAC  \"6 Clicks\" Daily Activity Inpatient Short Form   1. Putting on and taking off regular lower body clothing? 4 - None   2. Bathing (including washing, rinsing, drying)? 4 - None   3. Toileting, which includes using toilet, bedpan or urinal? 4 - None   4. Putting on and taking off regular upper body clothing? 4 - None   5. Taking care of personal grooming such as brushing teeth? 4 - None   6. Eating meals? 4 - None   Daily Activity Raw Score (Score out of 24.Lower scores equate to lower levels of function) 24   Total Evaluation Time   Total Evaluation Time (Minutes) 10     "

## 2019-05-07 NOTE — PROGRESS NOTES
[facility] Cardiology Progress Note    58 yo man with history CAD, prior PCI's was playing football and experienced chest pain at 1300 on 5/6/2019.  In ED, triaged to heart cath lab for inferior STEMI.  RCA occluded; 2 stents placed.         Assessment and Plan:   Assessment:   1. Inferior STEMI - Chest pain at 1300, back from cath lab by about 1600.  Pain-free, EKG has essentially normalized after stenting (2) of RCA.  Will trend troponin until falling.  Cardiac rehab today.  If does well home in AM.    Lab Results   Component Value Date    TROPI 6.922 () 05/07/2019    TROPI 2.175 () 05/06/2019    TROPI <0.015 05/06/2019     Will need staged intervention on proximal circumflex.    2. CAD - prior PCI, will need aggressive risk intervention.  Stop smoking - discussed, stopped 2 days before admission.      Plan:   As above.  Trend troponins until they fall.  Echo - results pending.  DAPT.  Statin.  Beta blockade, ACEI.  Trend troponins.  Cardiac rehab.  Stop smoking.  Transfer to telemetry.  Home in AM.            Significant Problems:     Patient Active Problem List    Diagnosis Date Noted     STEMI (ST elevation myocardial infarction) (H) 05/06/2019     Priority: Medium             Subjective:     No chest pain or dyspnea.          Medications:   Scheduled:    aspirin  81 mg Oral Daily     atorvastatin  40 mg Oral Daily     lisinopril  5 mg Oral Daily     metoprolol tartrate  25 mg Oral BID     morphine  0.5 mg Intravenous Once     ticagrelor  90 mg Oral Q12H            Physical Exam:   All vitals have been reviewed  Temp:  [97.8  F (36.6  C)-98.5  F (36.9  C)] 97.8  F (36.6  C)  Pulse:  [53-94] 68  Heart Rate:  [59-92] 59  Resp:  [9-19] 16  BP: (128-190)/() 138/96  SpO2:  [96 %-100 %] 99 %  Vitals:    05/06/19 1509 05/06/19 1519 05/07/19 0600   Weight: 79.4 kg (175 lb) 78.7 kg (173 lb 8 oz) 80.1 kg (176 lb 9.6 oz)     No intake/output data recorded.     NAD, alert and oriented X 3.    Skin: warm  and dry.    Head: normocephalic.    Neck:   No JVD.     Lungs:   Clear without wheezes, rhonchi.     Cardiovascular:   S1, S2, regular, no murmur or rub.     Ext's: no edema.    Neuro: facies symmetric, non-focal.          Data:   Last Basic Metabolic Panel:  Last Comprehensive Metabolic Panel:  Sodium   Date Value Ref Range Status   05/07/2019 142 133 - 144 mmol/L Final     Potassium   Date Value Ref Range Status   05/07/2019 4.9 3.4 - 5.3 mmol/L Final     Chloride   Date Value Ref Range Status   05/07/2019 110 (H) 94 - 109 mmol/L Final     Carbon Dioxide   Date Value Ref Range Status   05/07/2019 28 20 - 32 mmol/L Final     Anion Gap   Date Value Ref Range Status   05/07/2019 4 3 - 14 mmol/L Final     Glucose   Date Value Ref Range Status   05/07/2019 94 70 - 99 mg/dL Final     Urea Nitrogen   Date Value Ref Range Status   05/07/2019 13 7 - 30 mg/dL Final     Creatinine   Date Value Ref Range Status   05/07/2019 1.16 0.66 - 1.25 mg/dL Final     GFR Estimate   Date Value Ref Range Status   05/07/2019 71 >60 mL/min/[1.73_m2] Final     Comment:     Non  GFR Calc  Starting 12/18/2018, serum creatinine based estimated GFR (eGFR) will be   calculated using the Chronic Kidney Disease Epidemiology Collaboration   (CKD-EPI) equation.       Calcium   Date Value Ref Range Status   05/07/2019 9.3 8.5 - 10.1 mg/dL Final       Lab Results   Component Value Date    WBC 10.5 05/07/2019    HGB 15.8 05/07/2019    HCT 44.9 05/07/2019    MCV 95 05/07/2019     05/07/2019     Lab Results   Component Value Date    INR 0.93 05/06/2019     Lab Results   Component Value Date    TROPI 6.922 (HH) 05/07/2019    TROPI 2.175 (HH) 05/06/2019    TROPI <0.015 05/06/2019         Ginger Booth MD  5/7/2019

## 2019-05-07 NOTE — CONSULTS
Consult Date:  05/06/2019      PRIMARY CARE PROVIDER:  None on file.      CHIEF COMPLAINT:  Chest pain.      REQUESTING PHYSICIAN:  Dr. Epstein of Cardiology      REASON FOR CONSULTATION:  Inferior STEMI comanagement.      HISTORY OF PRESENT ILLNESS:  Matty Lacy is a pleasant 54-year-old male with a past medical history of coronary artery disease with prior PCIs, who presented to the Emergency Department today with complaints of chest pain.  The patient had his last stent about 8 years ago at [facility] and had 1 stent prior to that.  He has risk factors including nicotine use, family history, and likely hypertension and hyperlipidemia.  Today he was throwing a football when he had the sudden onset of chest pain radiating to his left arm and up into his jaw.  He described this as a burning sensation and states it was reminiscent of his previous MI chest pain.  He denies any shortness of breath, lightheadedness, diaphoresis, nausea or vomiting.  He denies any recent illnesses.  No abdominal pain.  Denies any focal weakness, numbness, tingling, palpitations or headache.  He decided to present to the Emergency Department for further evaluation.      In the Emergency Department, the patient was evaluated by Dr. Dillard.  There he was found to have a blood pressure of 190/110 with heart rate of 86.  He was satting at 98% on room air.  An EKG showed ST elevation in II, III and aVF consistent with acute inferior infarct.  His initial troponin was negative.  INR 0.93.  BMP and CBC were mostly unremarkable other than a borderline WBC of 11.5 and creatinine of 1.17.  He was given full dose aspirin as well as nitroglycerin and started on heparin and loaded with Brilinta.  He was taken emergently to the Cath Lab for intervention.      The patient was found to have an RCA occlusion and 2 stents were placed.  Subsequently he has been chest pain-free and his EKG has essentially normalized after the stenting.  He also has a  proximal circumflex lesion that will need staged intervention.  He will also need aggressive risk factor management.  He has been admitted to the Cardiac Intensive Care Unit for further monitoring.      PAST MEDICAL HISTORY:   1.  Coronary artery disease with history of PCI and stenting, most recently approximately 8 years ago at [facility].   2.  Tobacco use disorder.      PAST SURGICAL HISTORY:  Stents.  The patient otherwise denies any previous surgeries.      FAMILY HISTORY:  This is positive for early coronary artery disease.  Otherwise reviewed and negative for this presentation.      SOCIAL HISTORY:  The patient is single.  He currently smokes but states, coincidentally, he quit about 2 days ago.  He denies alcohol use and denies any illicit drug use.      PRIOR TO ADMISSION MEDICATIONS:  The patient denies taking any medications prior to admission, stating that he was taken off of them years ago.      ALLERGIES:  NO KNOWN DRUG ALLERGIES.      REVIEW OF SYSTEMS:  A complete 10-point review of systems was performed and is negative other than the items previously mentioned in the above HPI.      PHYSICAL EXAMINATION:   VITAL SIGNS:  Blood pressure 146/99, heart rate 80 beats per minute, temperature 98.4, respiratory rate 18, oxygen saturation 99% on room air.   GENERAL:  The patient is alert, oriented to person, place, date and situation, cooperative, lying in bed, no apparent distress.   EYES:  Pupils equal and round.  Extraocular movements intact.     HENT:  Head normocephalic.  Throat, lips, mucosa and tongue appear moist.   NECK:  Supple.   CARDIOVASCULAR:  Heart regular rate and rhythm, no murmurs, rubs or gallops.  Distal pulses are intact.  Left wrist compression device is in place.   PULMONARY:  Lungs clear to auscultation bilaterally, no crackles, wheezes or rhonchi.  Breathing is nonlabored   GASTROINTESTINAL:  Abdomen is soft, nontender, nondistended with normoactive bowel sounds.    MUSCULOSKELETAL:  The patient moves all 4 extremities equally with normal strength.   NEUROLOGIC:  Alert, cranial nerves II-XII are grossly intact.  Motor function intact.  No focal deficits.   SKIN:  Warm, dry, nondiaphoretic.   PSYCHIATRIC:  Normal mood and affect.      LABORATORY DATA:  Troponin less than 0.015.  CBC:  WBC 11.5, hemoglobin 16.8, hematocrit 47.3, platelet count 230.  BMP:  Potassium 3.6, creatinine 1.17, glucose 129, otherwise within normal limits.  Lipid panel:  Total cholesterol 249, HDL 35,  non-, triglycerides 363.  INR was 0.93, PTT 26.      EKG:  ST elevations in leads II, III and aVF consistent with inferior STEMI.  Repeat EKG, status post PCI appears normal.      Cardiac catheterization report is reviewed in Epic.      ASSESSMENT AND PLAN:  Matty Lacy is a pleasant 54-year-old male with a past medical history of coronary artery disease and stents, who developed chest pain today and presented to the Emergency Department.  He was found to have an inferior STEMI, was taken emergently to the Cath Lab where he had 2 stents placed to the RCA and will need staged intervention of his circumflex artery.  Hospitalist Service was consulted for medical comanagement.      1.  Inferior STEMI.  This is in the setting of known history of coronary artery disease with his last stent being at [facility] about 8 years ago.  He developed chest pain today, radiating to the left upper extremity and jaw.  He was found to have a RCA occlusion and got 2 drug-eluting stents.  He has residual disease in the circumflex artery that will need to be intervened on at a later time.  He was given Brilinta and aspirin.  Brilinta will need to continue uninterrupted for 1 year and aspirin to continue indefinitely.  He has also been started on lisinopril and metoprolol.  His LDL is elevated at 141.  Lipitor 40 mg daily has been started.  We will defer further management to Cardiology.  Monitor in  [facility].      2.  Tobacco use disorder.  The patient reports quitting 2 days ago, coincidently.  Smoking cessation was encouraged.  The patient states he is ready to quit.      3.  Probable hypertension, hyperlipidemia, not currently on treatment prior to admission.  The patient's blood pressure was very elevated on arrival at 190/110.  His blood pressures do still remain elevated in the 150s/100s currently.  He has been started on lisinopril 5 mg daily and metoprolol tartrate 25 mg b.i.d.  We will titrate medications per Cardiology.  Lipid panel as above with  and HDL 35.  A statin has been started.      4.  Deep venous thrombosis prophylaxis:  This will be with ambulation and PCDs.      CODE STATUS:  The patient is full code.      This patient [facility]  She is in agreement with my assessment and plan of care.         CHUY BLACKBURN DO       As dictated by BEN SOUZA PA-C            D: 2019   T: 2019   MT: GUNNAR      Name:     SULTANA VARGAS   MRN:      [id number]        Account:       [id number]   :      1965           Consult Date:  2019      Document: [id number]       cc: Ashanti Epstein MD

## 2019-05-07 NOTE — PLAN OF CARE
Neuro- A&Ox4    Vitals- stable except elevated BP's, given additional IV metoprolol 5mg x2 and lisinopril 10mg per MD    Tele- SR    Resp- stable on RA, denies SOB    Activity- up independently    Pain- c/o chest tightness 3/10, EKG obtained- no changes, offered nitroglycerin and morphine but pt refused, pain subsided while sleeping     Drips- none    Drains/Tubes- none    Skin- left radial site post angio-bandaid applied    GI/- no concerns    Plan- cardiac rehab, continue to monitor    Misc- n/a

## 2019-05-08 ENCOUNTER — APPOINTMENT (OUTPATIENT)
Dept: OCCUPATIONAL THERAPY | Facility: CLINIC | Age: 54
DRG: 247 | End: 2019-05-08
Payer: COMMERCIAL

## 2019-05-08 VITALS
TEMPERATURE: 98.2 F | OXYGEN SATURATION: 97 % | SYSTOLIC BLOOD PRESSURE: 119 MMHG | RESPIRATION RATE: 18 BRPM | BODY MASS INDEX: 23.72 KG/M2 | DIASTOLIC BLOOD PRESSURE: 74 MMHG | HEART RATE: 60 BPM | HEIGHT: 72 IN | WEIGHT: 175.1 LBS

## 2019-05-08 PROCEDURE — 99231 SBSQ HOSP IP/OBS SF/LOW 25: CPT | Performed by: NURSE PRACTITIONER

## 2019-05-08 PROCEDURE — 99239 HOSP IP/OBS DSCHRG MGMT >30: CPT | Performed by: PHYSICIAN ASSISTANT

## 2019-05-08 PROCEDURE — 25000132 ZZH RX MED GY IP 250 OP 250 PS 637: Performed by: STUDENT IN AN ORGANIZED HEALTH CARE EDUCATION/TRAINING PROGRAM

## 2019-05-08 PROCEDURE — 97110 THERAPEUTIC EXERCISES: CPT | Mod: GO | Performed by: OCCUPATIONAL THERAPIST

## 2019-05-08 RX ORDER — ATORVASTATIN CALCIUM 40 MG/1
40 TABLET, FILM COATED ORAL DAILY
Qty: 30 TABLET | Refills: 0 | Status: SHIPPED | OUTPATIENT
Start: 2019-05-08 | End: 2019-05-16

## 2019-05-08 RX ORDER — LISINOPRIL 5 MG/1
5 TABLET ORAL DAILY
Qty: 30 TABLET | Refills: 0 | Status: SHIPPED | OUTPATIENT
Start: 2019-05-08 | End: 2019-05-16

## 2019-05-08 RX ORDER — NITROGLYCERIN 0.4 MG/1
TABLET SUBLINGUAL
Qty: 10 TABLET | Refills: 0 | Status: SHIPPED | OUTPATIENT
Start: 2019-05-08 | End: 2019-05-16

## 2019-05-08 RX ORDER — METOPROLOL TARTRATE 25 MG/1
25 TABLET, FILM COATED ORAL 2 TIMES DAILY
Qty: 60 TABLET | Refills: 0 | Status: SHIPPED | OUTPATIENT
Start: 2019-05-08 | End: 2019-05-16

## 2019-05-08 RX ADMIN — LISINOPRIL 5 MG: 5 TABLET ORAL at 08:51

## 2019-05-08 RX ADMIN — METOPROLOL TARTRATE 25 MG: 25 TABLET ORAL at 08:50

## 2019-05-08 RX ADMIN — TICAGRELOR 90 MG: 90 TABLET ORAL at 04:17

## 2019-05-08 RX ADMIN — ASPIRIN 81 MG: 81 TABLET, COATED ORAL at 08:51

## 2019-05-08 RX ADMIN — ATORVASTATIN CALCIUM 40 MG: 40 TABLET, FILM COATED ORAL at 08:51

## 2019-05-08 ASSESSMENT — ACTIVITIES OF DAILY LIVING (ADL)
ADLS_ACUITY_SCORE: 10

## 2019-05-08 ASSESSMENT — MIFFLIN-ST. JEOR: SCORE: 1677.25

## 2019-05-08 NOTE — PROGRESS NOTES
[facility]    Cardiology Progress Note    Date of Service (when I saw the patient): 05/08/2019     Assessment & Plan   Matty Lacy is a 54 year old male who was admitted on 5/6/2019 with an inferior STEMI. Hx of previous RCA stent and smoking, quit 2 days ago.    Inferior STEMI  -S/p RCA stenting  -No futher CP  -Cardiac rehab going well without symptoms  -Sinus on tele  -Preserved EF  -Statin, DAPT, BB  Plan:  Continue current treatment.  Having insurance issues, may switch to Plavix in clinic, gets one month free of Brilinta.    CAD  Previous RCA stent patent  Multivessel CAD with a residual 75% OM  Plan  Staged OM    Hypertension  -staged on BB and ACEi  Controlled    Smoking  -started smoking at age 14. Has quit multiple times, most recently two days before infarct.  Plan  Continue to refrain from smoking    Okay to discharge from cardiac perspective.  Follow up with me arragnged next week, BMP prior.      Interval History   RCA stenting and rehab yesterday. VSS, sinsus on tele, no cp    Physical Exam   Temp: 98.2  F (36.8  C) Temp src: Oral BP: 128/82 Pulse: 60 Heart Rate: 62 Resp: 18 SpO2: 96 % O2 Device: None (Room air)    Vitals:    05/06/19 1519 05/07/19 0600 05/08/19 0638   Weight: 78.7 kg (173 lb 8 oz) 80.1 kg (176 lb 9.6 oz) 79.4 kg (175 lb 1.6 oz)     Vital Signs with Ranges  Temp:  [97.8  F (36.6  C)-98.9  F (37.2  C)] 98.2  F (36.8  C)  Pulse:  [60-68] 60  Heart Rate:  [58-87] 62  Resp:  [16-18] 18  BP: (102-144)/(68-96) 128/82  SpO2:  [95 %-100 %] 96 %  I/O last 3 completed shifts:  In: 360 [P.O.:360]  Out: -     Constitutional     alert and oriented, in no acute distress.     Skin     warm and dry to touch    ENT     no pallor or cyanosis    Neck    Supple, JVP normal    Chest     no tenderness to palpation    Lungs  clear to auscultation     Cardiac  regular rhythm, S1 normal, S2 normal    Abdomen     abdomen soft    Extremities and Back     No edema observed. Radial site  with good pulses        Neurological     no gross motor deficits noted, affect appropriate, oriented to time, person and place.    Medications     Percutaneous Coronary Intervention orders placed (this is information for BPA alerting)         aspirin  81 mg Oral Daily     atorvastatin  40 mg Oral Daily     lisinopril  5 mg Oral Daily     metoprolol tartrate  25 mg Oral BID     morphine  0.5 mg Intravenous Once     ticagrelor  90 mg Oral Q12H       Data   Results for orders placed or performed during the hospital encounter of 19 (from the past 24 hour(s))   Pharmacy Liaison for Medication Coverage    Narrative    Jacquelyn Bunn     2019 10:55 AM  Medication coverage check for Brilinta. Patient has no insurance.   Brilinta would be fairly expensive for him going forward, where   clopidogrel would be about $15 monthly.  Jacquelyn Bunn [facility] Discharge Pharmacy Liaison  Liaison Cell: [phone]   Echocardiogram Complete    Narrative    [id number]  [id number]  [id number]  [id number]^BREE^CIERA^[facility]  Echocardiography Laboratory  [address] [place] [address]        Name: SULTANA VARGAS  MRN: [id number]  : 1965  Study Date: 2019 09:43 AM  Age: 54 yrs  Gender: Male  Patient Location: [facility]  Reason For Study: CAD  Ordering Physician: CIERA GIORN  Performed By: Sarah Singleton     BSA: 2.0 m2  Height: 72 in  Weight: 173 lb  BP: 136/88 mmHg  _____________________________________________________________________________  __        Procedure  Complete Portable Echo Adult. Contrast Definity.  _____________________________________________________________________________  __        Interpretation Summary     Left ventricular systolic function is low normal.  The visual ejection fraction is estimated at 50-55%.  The study was technically difficult. There is no comparison study  available.  _____________________________________________________________________________  __        Left Ventricle  The left ventricle is normal in size. There is concentric remodeling present.  Left ventricular diastolic function is normal. Left ventricular systolic  function is low normal. The visual ejection fraction is estimated at 50-55%.  No regional wall motion abnormalities noted. Septal motion is consistent with  conduction abnormality.     Right Ventricle  The right ventricle is normal size. The right ventricular systolic function is  mildly reduced.     Atria  Normal left atrial size. Right atrial size is normal.        Mitral Valve  There is mild mitral annular calcification. There is trace mitral  regurgitation.     Tricuspid Valve  There is trace tricuspid regurgitation. IVC diameter <2.1 cm collapsing >50%  with sniff suggests a normal RA pressure of 3 mmHg. Right ventricular systolic  pressure could not be approximated due to inadequate tricuspid regurgitation.     Aortic Valve  There is mild trileaflet aortic sclerosis. No aortic regurgitation is present.  No aortic stenosis is present.     Pulmonic Valve  The pulmonic valve is not well visualized.     Vessels  Borderline aortic root dilatation.     Pericardium  There is no pericardial effusion.        Rhythm  Sinus rhythm was noted.  _____________________________________________________________________________  __     MMode/2D Measurements & Calculations  IVSd: 0.95 cm  LVIDd: 4.7 cm  LVIDs: 3.3 cm  LVPWd: 1.1 cm  FS: 30.0 %  LV mass(C)d: 176.2 grams  LV mass(C)dI: 88.0 grams/m2  Ao root diam: 3.8 cm  LA dimension: 3.2 cm  asc Aorta Diam: 3.6 cm  LA/Ao: 0.84  LVOT diam: 2.2 cm  LVOT area: 3.8 cm2     EF(MOD-bp): 54.2 %  LA Volume (BP): 54.0 ml     LA Volume Index (BP): 27.0 ml/m2  RWT: 0.48        Doppler Measurements & Calculations  MV E max roxanna: 71.1 cm/sec  MV A max roxanna: 53.3 cm/sec  MV E/A: 1.3  MV dec time: 0.16 sec  PA V2 max: 78.2  cm/sec  PA max P.4 mmHg  PA acc time: 0.11 sec  PI end-d roxanna: 82.5 cm/sec  E/E' av.3  Lateral E/e': 7.4  Medial E/e': 9.3              _____________________________________________________________________________  __        Report approved by: Bety Jain 2019 11:11 AM      Troponin I   Result Value Ref Range    Troponin I ES 3.970 (HH) 0.000 - 0.045 ug/L   Troponin I   Result Value Ref Range    Troponin I ES 3.234 (HH) 0.000 - 0.045 ug/L       CURT Alvarez, CNP  Cardiology  Pager:  [phone]

## 2019-05-08 NOTE — PLAN OF CARE
Discharge Planner OT   Patient plan for discharge: home  Current status: pt completed TDM with gradual increase to 2.6 mph for 20 mins with not adverse symptoms and blunted BP response, see vital sign flow sheet. Pt aware of CAD risk factors and encouraged to attend OPCR unsure if pt will.   Barriers to return to prior living situation: none  Recommendations for discharge: home with OP CR at [facility], encouraged pt to attend.   Rationale for recommendations: OP CR for monitored progressive exercise and risk factor education and modification.          Entered by: Cathryn Da Silva 05/08/2019 10:53 AM     Occupational Therapy Discharge Summary    Reason for therapy discharge:    Discharged to home with outpatient therapy.    Progress towards therapy goal(s). See goals on Care Plan in [facility] electronic health record for goal details.  Goals met    Therapy recommendation(s):    Continued therapy is recommended.  Rationale/Recommendations:  home with OP CR at [facility] for monitored progressive exercise and risk factor education and modification. .

## 2019-05-08 NOTE — PLAN OF CARE
Patient A&O x4, Denies chest pain/tightness/pressure and on tele SR and VSS,  Lungs are clear and on RA, up independently, on cardiac diet, skin is L radial site C/D/I and covered with band aid, voiding well. IV is in  R arm and SL. Plan for possible discharge home and will come back for Cx 70% stenosis later on.

## 2019-05-08 NOTE — DISCHARGE SUMMARY
[facility]  Hospitalist Discharge Summary       Date of Admission:  5/6/2019  Date of Discharge:  5/8/2019  Discharging Provider: Jennie Loza PA-C      Discharge Diagnoses   H/o CAD with previous STEMI x 2 and stent to RCA (2008) and Circumflex (2010)  Inferior STEMI:   Hypertension  Hyperlipidemia  Tobacco Use Disorder    Follow-ups Needed After Discharge   Follow-up Appointments     Follow-up and recommended labs and tests       Follow up with Cardiology as scheduled  After you obtain insurance establish with a primary MD             Unresulted Labs Ordered in the Past 30 Days of this Admission     No orders found from 3/7/2019 to 5/7/2019.          Discharge Disposition   Discharged to home  Condition at discharge: Stable    Hospital Course    HPI from H&P per Dereck Phipps PA-C    Matty Lacy is a pleasant 54-year-old male with a past medical history of coronary artery disease with prior PCIs, who presented to the Emergency Department today with complaints of chest pain.  The patient had his last stent about 8 years ago at [facility] and had 1 stent prior to that.  He has risk factors including nicotine use, family history, and likely hypertension and hyperlipidemia.  Today he was throwing a football when he had the sudden onset of chest pain radiating to his left arm and up into his jaw.  He described this as a burning sensation and states it was reminiscent of his previous MI chest pain.  He denies any shortness of breath, lightheadedness, diaphoresis, nausea or vomiting.  He denies any recent illnesses.  No abdominal pain.  Denies any focal weakness, numbness, tingling, palpitations or headache.  He decided to present to the Emergency Department for further evaluation.      In the Emergency Department, the patient was evaluated by Dr. Dillard.  There he was found to have a blood pressure of 190/110 with heart rate of 86.  He was satting at 98% on room air.  An EKG showed ST  elevation in II, III and aVF consistent with acute inferior infarct.  His initial troponin was negative.  INR 0.93.  BMP and CBC were mostly unremarkable other than a borderline WBC of 11.5 and creatinine of 1.17.  He was given full dose aspirin as well as nitroglycerin and started on heparin and loaded with Brilinta.  He was taken emergently to the Cath Lab for intervention.      The patient was found to have an RCA occlusion and 2 stents were placed.  Subsequently he has been chest pain-free and his EKG has essentially normalized after the stenting.  He also has a proximal circumflex lesion that will need staged intervention.  He will also need aggressive risk factor management.  He has been admitted to the Cardiac Intensive Care Unit for further monitoring.     H/o CAD with previous STEMI x 2 and stent to RCA (2008) and Circumflex (2010)  Inferior STEMI: He was found to have a RCA occlusion now s/p 2 drug-eluting stents.  He has residual disease in the circumflex artery that will need to be intervened on at a later time.   - Echo with EF 50-55% with no WMA  - Continue Brilinta, BB, ACE, BB and statin. Given 1 month free of Brilinta. Cardiology will consider transition to Plavix as outpatient pending insurance coverage.   - Will need staged intervention of Circumflex in the future     Hypertension: BP elevated on arrival. Not on medications PTA  - Continue Metoprolol 25 mg bid  - Lisinopril 5 mg/d    Hyperlipidemia: Cholesterol 249, , Triglycerides  - Lipitor 40 mg/d     Tobacco use disorder:  The patient reports quitting 2 days ago, coincidently.  Smoking cessation was encouraged.    - Declined need for nicotine replacement      Consultations This Hospital Stay   NUTRITION SERVICES ADULT IP CONSULT  CARDIAC REHAB IP CONSULT  PHARMACY IP CONSULT  PHARMACY IP CONSULT  SOCIAL WORK IP CONSULT  PHARMACY LIAISON FOR MEDICATION COVERAGE CONSULT  SMOKING CESSATION PROGRAM IP CONSULT    Code Status   Full  Code    Time Spent on this Encounter   I, Jennie Lopez Dago, personally saw the patient today and spent greater than 30 minutes discharging this patient.       Jennie Loza PA-C  [facility]  ______________________________________________________________________    Physical Exam   Vital Signs: Temp: 98.2  F (36.8  C) Temp src: Oral BP: 128/82 Pulse: 60 Heart Rate: 62 Resp: 18 SpO2: 96 % O2 Device: None (Room air)    Weight: 175 lbs 1.6 oz  Constitutional: Alert, resting comfortably in NAD  Respiratory: Normal effort, symmetric expansion, no crackles or wheezing  Cardiovascular: RRR no murmurs   GI: Non distended, normal bowels sounds, no tenderness or guarding  MSK: LE without edema. Dorsalis pedis pulse palpated bilaterally.   Skin/Integumen: Clear  Neuro: CN II-XII grossly intact  Psych:  Alert and oriented x 3. Normal affect         Primary Care Physician   No primary care provider on file.    Discharge Orders      Basic metabolic panel     CARDIAC REHAB REFERRAL      Reason for your hospital stay    You were admitted after a STEMI. You underwent stenting of your RCA. You will need an additional procedure to your circumflex in the future. Continue to keep up with your smoking cessation.     Follow-up and recommended labs and tests     Follow up with Cardiology as scheduled  After you obtain insurance establish with a primary MD     When to contact your care team    IF you have recurrent chest pain.     Diet    Follow this diet upon discharge: Orders Placed This Encounter      Combination Diet Low Saturated Fat Diet       Significant Results and Procedures   Most Recent 3 CBC's:  Recent Labs   Lab Test 05/07/19  0625 05/06/19  1520   WBC 10.5 11.5*   HGB 15.8 16.8   MCV 95 94    230     Most Recent 3 BMP's:  Recent Labs   Lab Test 05/07/19  0625 05/06/19  1520    141   POTASSIUM 4.9 3.6   CHLORIDE 110* 106   CO2 28 26   BUN 13 12   CR 1.16 1.17   ANIONGAP 4 9   CHRISTINE 9.3 9.3    GLC 94 129*     Most Recent 3 Troponin's:  Recent Labs   Lab Test 05/07/19  2248 05/07/19  1418 05/07/19  0625   TROPI 3.234* 3.970* 6.922*     Most Recent Cholesterol Panel:  Recent Labs   Lab Test 05/06/19  1520   CHOL 249*   *   HDL 35*   TRIG 363*   , No results found for this or any previous visit.    Discharge Medications   Current Discharge Medication List      START taking these medications    Details   aspirin (ASA) 81 MG EC tablet Take 1 tablet (81 mg) by mouth daily  Qty: 30 tablet, Refills: 0    Associated Diagnoses: Coronary artery disease involving native coronary artery of native heart without angina pectoris      atorvastatin (LIPITOR) 40 MG tablet Take 1 tablet (40 mg) by mouth daily  Qty: 30 tablet, Refills: 0    Associated Diagnoses: Coronary artery disease involving native coronary artery of native heart without angina pectoris      lisinopril (PRINIVIL/ZESTRIL) 5 MG tablet Take 1 tablet (5 mg) by mouth daily  Qty: 30 tablet, Refills: 0    Associated Diagnoses: Coronary artery disease involving native coronary artery of native heart without angina pectoris      metoprolol tartrate (LOPRESSOR) 25 MG tablet Take 1 tablet (25 mg) by mouth 2 times daily  Qty: 60 tablet, Refills: 0    Associated Diagnoses: Coronary artery disease involving native coronary artery of native heart without angina pectoris      nitroGLYcerin (NITROSTAT) 0.4 MG sublingual tablet For chest pain place 1 tablet under the tongue every 5 minutes for 3 doses. If symptoms persist 5 minutes after 1st dose call 911.  Qty: 10 tablet, Refills: 0    Associated Diagnoses: Coronary artery disease involving native coronary artery of native heart without angina pectoris      ticagrelor (BRILINTA) 90 MG tablet Take 1 tablet (90 mg) by mouth every 12 hours  Qty: 60 tablet, Refills: 0    Associated Diagnoses: Coronary artery disease involving native coronary artery of native heart without angina pectoris           Allergies   No  Known Allergies

## 2019-05-08 NOTE — PLAN OF CARE
Neuro- A&Ox4     Vitals- stable      Tele- SR     Resp- stable on RA, denies SOB     Activity- up independently     Pain- denies      Drips- none     Drains/Tubes- none     Skin- left radial site post angio-bandaid applied, CMS intact     GI/- no concerns     Plan- possible discharge today, outpatient cardiac rehab     Misc- n/a

## 2019-05-08 NOTE — PROGRESS NOTES
Care Coordination:    -Pt to establish with new PCP once he has insurance. No PCP appointment made today.     Lucinda Bui RN, BAN   Care Coordinator  Ph. [phone]

## 2019-05-09 ENCOUNTER — TELEPHONE (OUTPATIENT)
Dept: CARDIOLOGY | Facility: CLINIC | Age: 54
End: 2019-05-09

## 2019-05-09 NOTE — TELEPHONE ENCOUNTER
Patient was evaluated by cardiology while inpatient for chest pain that radiated to jaw and arms in presence of known CAD, continued smoking and medication noncompliance, STEMI. PMH of MI x 2 with BRIDGETTE x 2, last one was placed 8 yrs ago. 5/6/19 PCI via LRA with BRIDGETTE x 2 to 100% occluded mRCA, with 70% residual stenosis of ostial CFx. Attempted to call patient to discuss any post hospital d/c questions, review medication changes, and confirm f/u appts, but home phone number is invalid and listed work number is busy. Will try work number later. RN will confirm with patient that he was d/c with the antiplatelet Brilinta. RN will confirm with patient that he has an apt scheduled on 5/16/19 with STEFFANY Laly Rae, with labs prior. Cardiac rehab needs to be scheduled. HEIDY Figueroa RN.

## 2019-05-13 LAB
INTERPRETATION ECG - MUSE: NORMAL

## 2019-05-16 ENCOUNTER — OFFICE VISIT (OUTPATIENT)
Dept: CARDIOLOGY | Facility: CLINIC | Age: 54
End: 2019-05-16
Payer: COMMERCIAL

## 2019-05-16 VITALS
HEART RATE: 76 BPM | OXYGEN SATURATION: 96 % | WEIGHT: 179.9 LBS | BODY MASS INDEX: 24.4 KG/M2 | SYSTOLIC BLOOD PRESSURE: 127 MMHG | DIASTOLIC BLOOD PRESSURE: 81 MMHG

## 2019-05-16 DIAGNOSIS — I25.10 CORONARY ARTERY DISEASE INVOLVING NATIVE CORONARY ARTERY OF NATIVE HEART WITHOUT ANGINA PECTORIS: ICD-10-CM

## 2019-05-16 LAB
ANION GAP SERPL CALCULATED.3IONS-SCNC: 5 MMOL/L (ref 3–14)
BUN SERPL-MCNC: 15 MG/DL (ref 7–30)
CALCIUM SERPL-MCNC: 9.6 MG/DL (ref 8.5–10.1)
CHLORIDE SERPL-SCNC: 103 MMOL/L (ref 94–109)
CO2 SERPL-SCNC: 26 MMOL/L (ref 20–32)
CREAT SERPL-MCNC: 1.28 MG/DL (ref 0.66–1.25)
GFR SERPL CREATININE-BSD FRML MDRD: 63 ML/MIN/{1.73_M2}
GLUCOSE SERPL-MCNC: 91 MG/DL (ref 70–99)
POTASSIUM SERPL-SCNC: 4.4 MMOL/L (ref 3.4–5.3)
SODIUM SERPL-SCNC: 134 MMOL/L (ref 133–144)

## 2019-05-16 PROCEDURE — 36415 COLL VENOUS BLD VENIPUNCTURE: CPT | Performed by: NURSE PRACTITIONER

## 2019-05-16 PROCEDURE — 99214 OFFICE O/P EST MOD 30 MIN: CPT | Performed by: NURSE PRACTITIONER

## 2019-05-16 PROCEDURE — 80048 BASIC METABOLIC PNL TOTAL CA: CPT | Performed by: NURSE PRACTITIONER

## 2019-05-16 RX ORDER — SODIUM CHLORIDE 9 MG/ML
INJECTION, SOLUTION INTRAVENOUS CONTINUOUS
Status: CANCELLED | OUTPATIENT
Start: 2019-05-16

## 2019-05-16 RX ORDER — LIDOCAINE 40 MG/G
CREAM TOPICAL
Status: CANCELLED | OUTPATIENT
Start: 2019-05-16

## 2019-05-16 RX ORDER — NITROGLYCERIN 0.4 MG/1
TABLET SUBLINGUAL
Qty: 10 TABLET | Refills: 0 | Status: SHIPPED | OUTPATIENT
Start: 2019-05-16 | End: 2021-02-12

## 2019-05-16 RX ORDER — LISINOPRIL 5 MG/1
5 TABLET ORAL DAILY
Qty: 90 TABLET | Refills: 3 | Status: SHIPPED | OUTPATIENT
Start: 2019-05-16 | End: 2019-05-31

## 2019-05-16 RX ORDER — METOPROLOL TARTRATE 25 MG/1
25 TABLET, FILM COATED ORAL 2 TIMES DAILY
Qty: 180 TABLET | Refills: 3 | Status: SHIPPED | OUTPATIENT
Start: 2019-05-16 | End: 2019-05-31

## 2019-05-16 RX ORDER — ATORVASTATIN CALCIUM 40 MG/1
40 TABLET, FILM COATED ORAL DAILY
Qty: 90 TABLET | Refills: 3 | Status: SHIPPED | OUTPATIENT
Start: 2019-05-16 | End: 2019-08-23

## 2019-05-16 NOTE — PATIENT INSTRUCTIONS
I sent in refills for your medications. If your Brillinta is too expensive, then call me and I will switch you to Plavix.    Check your labs Monday or Tuesday to make sure your kidney function is okay.    See me after the angiogram.    Esme [phone]

## 2019-05-16 NOTE — PROGRESS NOTES
HPI and Plan:   I had the pleasure of seeing Matty Lacy today in cardiology clinic follow up. He is a pleasant 54 year old patient Dr. Epstein recently met when he presented after he developed chest pain while playing football with a inferior STEMI and 100% occluded mid RCA lesion.    Mr. Lacy has a history of coronary artery disease and previous RCA stenting, he said he had a heart attack when he was 43 years old which was treated at [facility].  He has smoked on and off most of his life.  He discontinued smoking 2 days before his most recent STEMI.  He has not resumed smoking.  He tells me he was told when he was in [place] sometime ago that he could stop his cholesterol medications.    His coronary angiogram showed 10% stenosis in the left main, 70% in the first diagonal, 70% in the ostial circumflex and 100% occluded mid RCA lesion felt to be the culprit.  He had mild to moderate scattered disease elsewhere.  He was placed on dual antiplatelet therapy with Brilinta and aspirin.  His statin was restarted.  His echocardiogram showed an EF of 50 to 55%, low normal LV systolic function.  There were no wall motion abnormalities and he had no significant valvular disease.  He was recommended to return for staged circumflex intervention.  He  recently started a new job and was between insurance when he was hospitalized, he has since been enrolled in [employer].  He is not had any recurrent symptoms but he has admittedly not participating in cardiac rehab.    Labs Reviewed: Cholesterol 249, HDL 35, , triglycerides 363, sodium 134, potassium 4.4, creatinine 1.28 (up from 1.  1 6).    Physical Exam  Please see Below     Assessment and Plan  1.    Inferior STEMI status post stenting with a 3.0 x 20 mm and 3.0 x 12 medium meter drug-eluting stent with ZENIA-3 flow.  He had a low normal ejection fraction.  He is on dual antiplatelet therapy with Brilinta and aspirin.  Of asked him to refill his Brilinta and find  out if its costly for him or not.  If it is expensive then we should consider switching him to Plavix.  He is on beta-blocker and ACE inhibitor therapy.  Initial creatinine.  I have asked him to recheck it next Monday or Tuesday.  His creatinine looks okay then he can proceed with staged intervention to his circumflex we will schedule next week.    The procedure, risks and benefits of coronary angiography were discussed with the patient in detail. The risks discussed include risk of heart attack, stroke, arrhythmia, bleeding, vascular trauma/complications, possible urgent bypass surgery, and death. We discussed that contrast is used during the procedure which can potentially damage the kidney. We discussed other diagnostics including possible FFR. The procedure is low risk. The patient may go home the same day and will need a .  He is on dual antiplatelet therapy. Patient would like to proceed. Consent will be obtained in the cath lab. This patient is medically optimized, blood pressure is controlled, he is  on a statin and aspirin therapy.  There is no contraindication to dual antiplatelet therapy.    2.  His creatinine today is a little bit elevated compared to his baseline.  I will recheck this on Monday  3.  Hyperlipidemia.  His LDL on admission was 141.  He has been restarted on Lipitor 40, we should recheck lipids in 2 to 3 months.  I discussed with him that this to be a lifelong medication.   4.  Hypertension.  Controlled on Lopressor and lisinopril.    Thank you for allowing me to care for Matty Lacy today.    CURT Whiteside, CNP  Cardiology    Voice recognition software was used for this note, I have reviewed this note, but errors may have been missed.    Orders Placed This Encounter   Procedures     Basic metabolic panel     Orders Placed This Encounter   Medications     atorvastatin (LIPITOR) 40 MG tablet     Sig: Take 1 tablet (40 mg) by mouth daily     Dispense:  90 tablet      Refill:  3     lisinopril (PRINIVIL/ZESTRIL) 5 MG tablet     Sig: Take 1 tablet (5 mg) by mouth daily     Dispense:  90 tablet     Refill:  3     metoprolol tartrate (LOPRESSOR) 25 MG tablet     Sig: Take 1 tablet (25 mg) by mouth 2 times daily     Dispense:  180 tablet     Refill:  3     nitroGLYcerin (NITROSTAT) 0.4 MG sublingual tablet     Sig: For chest pain place 1 tablet under the tongue every 5 minutes for 3 doses. If symptoms persist 5 minutes after 1st dose call 911.     Dispense:  10 tablet     Refill:  0     ticagrelor (BRILINTA) 90 MG tablet     Sig: Take 1 tablet (90 mg) by mouth every 12 hours     Dispense:  180 tablet     Refill:  3     Medications Discontinued During This Encounter   Medication Reason     atorvastatin (LIPITOR) 40 MG tablet Reorder     lisinopril (PRINIVIL/ZESTRIL) 5 MG tablet Reorder     metoprolol tartrate (LOPRESSOR) 25 MG tablet Reorder     nitroGLYcerin (NITROSTAT) 0.4 MG sublingual tablet Reorder     ticagrelor (BRILINTA) 90 MG tablet Reorder         CURRENT MEDICATIONS:  Current Outpatient Medications   Medication Sig Dispense Refill     aspirin (ASA) 81 MG EC tablet Take 1 tablet (81 mg) by mouth daily 30 tablet 0     atorvastatin (LIPITOR) 40 MG tablet Take 1 tablet (40 mg) by mouth daily 90 tablet 3     lisinopril (PRINIVIL/ZESTRIL) 5 MG tablet Take 1 tablet (5 mg) by mouth daily 90 tablet 3     metoprolol tartrate (LOPRESSOR) 25 MG tablet Take 1 tablet (25 mg) by mouth 2 times daily 180 tablet 3     nitroGLYcerin (NITROSTAT) 0.4 MG sublingual tablet For chest pain place 1 tablet under the tongue every 5 minutes for 3 doses. If symptoms persist 5 minutes after 1st dose call 911. 10 tablet 0     ticagrelor (BRILINTA) 90 MG tablet Take 1 tablet (90 mg) by mouth every 12 hours 180 tablet 3       ALLERGIES   No Known Allergies    PAST MEDICAL HISTORY:  Past Medical History:   Diagnosis Date     Myocardial infarction (H)        PAST SURGICAL HISTORY:  No past surgical history on  file.    FAMILY HISTORY:  Family History   Problem Relation Age of Onset     Coronary Artery Disease Maternal Grandfather        SOCIAL HISTORY:  Social History     Socioeconomic History     Marital status: Single     Spouse name: None     Number of children: None     Years of education: None     Highest education level: None   Occupational History     None   Social Needs     Financial resource strain: None     Food insecurity:     Worry: None     Inability: None     Transportation needs:     Medical: None     Non-medical: None   Tobacco Use     Smoking status: Former Smoker     Packs/day: 0.50     Years: 6.00     Pack years: 3.00     Types: Cigarettes     Last attempt to quit: 2019     Years since quittin.0     Smokeless tobacco: Never Used   Substance and Sexual Activity     Alcohol use: Yes     Comment: occ     Drug use: Never     Sexual activity: None   Lifestyle     Physical activity:     Days per week: None     Minutes per session: None     Stress: None   Relationships     Social connections:     Talks on phone: None     Gets together: None     Attends Rastafari service: None     Active member of club or organization: None     Attends meetings of clubs or organizations: None     Relationship status: None     Intimate partner violence:     Fear of current or ex partner: None     Emotionally abused: None     Physically abused: None     Forced sexual activity: None   Other Topics Concern     Parent/sibling w/ CABG, MI or angioplasty before 65F 55M? Not Asked   Social History Narrative     None       Review of Systems:  Skin:  Negative       Eyes:  Positive for glasses night driving   ENT:  Negative      Respiratory:  Positive for dyspnea on exertion     Cardiovascular:    Positive for;chest pain;exercise intolerance    Gastroenterology: Negative      Genitourinary:  not assessed      Musculoskeletal:  not assessed      Neurologic:  Negative      Psychiatric:  Negative      Heme/Lymph/Imm:  Negative       Endocrine:  Negative        Physical Exam:  Vitals: /81   Pulse 76   Wt 81.6 kg (179 lb 14.4 oz)   SpO2 96%   BMI 24.40 kg/m      Constitutional:  cooperative;in no acute distress appears anxious      Skin:  warm and dry to the touch          Head:  normocephalic        Eyes:  pupils equal and round        Lymph:      ENT:    poor dentition      Neck:  JVP normal        Respiratory:  clear to auscultation         Cardiac: regular rhythm;normal S1 and S2                pulses full and equal                                        GI:           Extremities and Muscular Skeletal:  no edema              Neurological:  no gross motor deficits;affect appropriate        Psych:  Alert and Oriented x 3 Anxious  Encounter Diagnosis   Name Primary?     Coronary artery disease involving native coronary artery of native heart without angina pectoris        Recent Lab Results:  LIPID RESULTS:  Lab Results   Component Value Date    CHOL 249 (H) 05/06/2019    HDL 35 (L) 05/06/2019     (H) 05/06/2019    TRIG 363 (H) 05/06/2019       LIVER ENZYME RESULTS:  No results found for: AST, ALT    CBC RESULTS:  Lab Results   Component Value Date    WBC 10.5 05/07/2019    RBC 4.74 05/07/2019    HGB 15.8 05/07/2019    HCT 44.9 05/07/2019    MCV 95 05/07/2019    MCH 33.3 (H) 05/07/2019    MCHC 35.2 05/07/2019    RDW 12.4 05/07/2019     05/07/2019       BMP RESULTS:  Lab Results   Component Value Date     05/16/2019    POTASSIUM 4.4 05/16/2019    CHLORIDE 103 05/16/2019    CO2 26 05/16/2019    ANIONGAP 5 05/16/2019    GLC 91 05/16/2019    BUN 15 05/16/2019    CR 1.28 (H) 05/16/2019    GFRESTIMATED 63 05/16/2019    GFRESTBLACK 73 05/16/2019    CHRISTINE 9.6 05/16/2019        A1C RESULTS:  No results found for: A1C    INR RESULTS:  Lab Results   Component Value Date    INR 0.93 05/06/2019           CC  No referring provider defined for this encounter.

## 2019-05-20 ENCOUNTER — TELEPHONE (OUTPATIENT)
Dept: CARDIOLOGY | Facility: CLINIC | Age: 54
End: 2019-05-20

## 2019-05-20 DIAGNOSIS — I25.10 CORONARY ARTERY DISEASE INVOLVING NATIVE CORONARY ARTERY OF NATIVE HEART WITHOUT ANGINA PECTORIS: ICD-10-CM

## 2019-05-20 LAB
ANION GAP SERPL CALCULATED.3IONS-SCNC: 15.5 MMOL/L (ref 6–17)
BUN SERPL-MCNC: 18 MG/DL (ref 7–30)
CALCIUM SERPL-MCNC: 10.5 MG/DL (ref 8.5–10.5)
CHLORIDE SERPL-SCNC: 103 MMOL/L (ref 98–107)
CO2 SERPL-SCNC: 26 MMOL/L (ref 23–29)
CREAT SERPL-MCNC: 1.31 MG/DL (ref 0.7–1.3)
GFR SERPL CREATININE-BSD FRML MDRD: 57 ML/MIN/{1.73_M2}
GLUCOSE SERPL-MCNC: 111 MG/DL (ref 70–105)
POTASSIUM SERPL-SCNC: 4.5 MMOL/L (ref 3.5–5.1)
SODIUM SERPL-SCNC: 140 MMOL/L (ref 136–145)

## 2019-05-20 PROCEDURE — 36415 COLL VENOUS BLD VENIPUNCTURE: CPT | Performed by: NURSE PRACTITIONER

## 2019-05-20 PROCEDURE — 80048 BASIC METABOLIC PNL TOTAL CA: CPT | Performed by: NURSE PRACTITIONER

## 2019-05-20 NOTE — TELEPHONE ENCOUNTER
ADDENDUM:  That is fine. Not much of a change. Ok for cath (Dr. Epstein)        BMP completed 5/20/19 and slightly increased from 5/16/19. Patient scheduled for staged procedure on 5/22/19. RN will send to Dr. Epstein for review to confirm ok to proceed with angio on 5/22/19 as STEFFANY Esme is not back in clinic until Wed 5/22/19    Component      Latest Ref Rng & Units 5/16/2019 5/20/2019   Sodium      136 - 145 mmol/L 134 140   Potassium      3.5 - 5.1 mmol/L 4.4 4.5   Chloride      98 - 107 mmol/L 103 103   Carbon Dioxide      23 - 29 mmol/L 26 26   Anion Gap      6 - 17 mmol/L 5 15.5   Glucose      70 - 105 mg/dL 91 111 (H)   Urea Nitrogen      7 - 30 mg/dL 15 18   Creatinine      0.70 - 1.30 mg/dL 1.28 (H) 1.31 (H)   GFR Estimate      >60 mL/min/1.73:m2 63 57 (L)   GFR Estimate If Black      >60 mL/min/1.73:m2 73 69   Calcium      8.5 - 10.5 mg/dL 9.6 10.5

## 2019-05-21 ENCOUNTER — TELEPHONE (OUTPATIENT)
Dept: CARDIOLOGY | Facility: CLINIC | Age: 54
End: 2019-05-21

## 2019-05-21 NOTE — TELEPHONE ENCOUNTER
Orders placed for sign and held on 5/16/19 per Esme LIZ.   Called pt to discuss instructions for angiogram procedure. Left detailed message for pt about instructions for heart cath. Verified that pt is to arrive at [facility] at 0630am and the procedure is scheduled at 0830am. Explained that pt should have someone with him to drive him to and from the procedure and to be able to stay with him for 24 hrs after procedure. Pt is not diabetic, is not on a diuretic, and no known allergy to dye. Pt should take 81mg aspirin and 90mg Brilinta tomorrow morning and can take morning medications with sips or water. Nothing to eat or drink after midnight tonight. Verified that pt has follow up on 5/29/19 with Esme. Pt gave verbal understanding.

## 2019-05-22 ENCOUNTER — HOSPITAL ENCOUNTER (OUTPATIENT)
Facility: CLINIC | Age: 54
Discharge: HOME | End: 2019-05-22
Admitting: INTERNAL MEDICINE
Payer: COMMERCIAL

## 2019-05-22 ENCOUNTER — SURGERY (OUTPATIENT)
Age: 54
End: 2019-05-22
Payer: COMMERCIAL

## 2019-05-22 VITALS
SYSTOLIC BLOOD PRESSURE: 102 MMHG | TEMPERATURE: 97 F | OXYGEN SATURATION: 97 % | WEIGHT: 179.9 LBS | BODY MASS INDEX: 24.37 KG/M2 | RESPIRATION RATE: 20 BRPM | HEART RATE: 59 BPM | DIASTOLIC BLOOD PRESSURE: 58 MMHG | HEIGHT: 72 IN

## 2019-05-22 DIAGNOSIS — Z98.61 STATUS POST PERCUTANEOUS TRANSLUMINAL CORONARY ANGIOPLASTY: Primary | ICD-10-CM

## 2019-05-22 DIAGNOSIS — I25.10 CORONARY ARTERY DISEASE INVOLVING NATIVE CORONARY ARTERY OF NATIVE HEART WITHOUT ANGINA PECTORIS: ICD-10-CM

## 2019-05-22 PROBLEM — Z98.890 STATUS POST CORONARY ANGIOGRAM: Status: ACTIVE | Noted: 2019-05-22

## 2019-05-22 LAB
ANION GAP SERPL CALCULATED.3IONS-SCNC: 6 MMOL/L (ref 3–14)
APTT PPP: 33 SEC (ref 22–37)
BUN SERPL-MCNC: 16 MG/DL (ref 7–30)
CALCIUM SERPL-MCNC: 9.3 MG/DL (ref 8.5–10.1)
CHLORIDE SERPL-SCNC: 109 MMOL/L (ref 94–109)
CO2 SERPL-SCNC: 24 MMOL/L (ref 20–32)
CREAT SERPL-MCNC: 1.08 MG/DL (ref 0.66–1.25)
ERYTHROCYTE [DISTWIDTH] IN BLOOD BY AUTOMATED COUNT: 11.9 % (ref 10–15)
GFR SERPL CREATININE-BSD FRML MDRD: 78 ML/MIN/{1.73_M2}
GLUCOSE SERPL-MCNC: 102 MG/DL (ref 70–99)
HCT VFR BLD AUTO: 43.8 % (ref 40–53)
HGB BLD-MCNC: 15.6 G/DL (ref 13.3–17.7)
INR PPP: 1.15 (ref 0.86–1.14)
KCT BLD-ACNC: 363 SEC (ref 75–150)
MCH RBC QN AUTO: 33.2 PG (ref 26.5–33)
MCHC RBC AUTO-ENTMCNC: 35.6 G/DL (ref 31.5–36.5)
MCV RBC AUTO: 93 FL (ref 78–100)
PLATELET # BLD AUTO: 235 10E9/L (ref 150–450)
POTASSIUM SERPL-SCNC: 4.1 MMOL/L (ref 3.4–5.3)
RBC # BLD AUTO: 4.7 10E12/L (ref 4.4–5.9)
SODIUM SERPL-SCNC: 139 MMOL/L (ref 133–144)
WBC # BLD AUTO: 7.7 10E9/L (ref 4–11)

## 2019-05-22 PROCEDURE — 99152 MOD SED SAME PHYS/QHP 5/>YRS: CPT | Performed by: INTERNAL MEDICINE

## 2019-05-22 PROCEDURE — 85610 PROTHROMBIN TIME: CPT

## 2019-05-22 PROCEDURE — 40000235 ZZH STATISTIC TELEMETRY

## 2019-05-22 PROCEDURE — 93572 IV DOP VEL&/PRESS C FLO EA: CPT | Mod: 26 | Performed by: INTERNAL MEDICINE

## 2019-05-22 PROCEDURE — C1725 CATH, TRANSLUMIN NON-LASER: HCPCS | Performed by: INTERNAL MEDICINE

## 2019-05-22 PROCEDURE — 92928 PRQ TCAT PLMT NTRAC ST 1 LES: CPT | Mod: LD | Performed by: INTERNAL MEDICINE

## 2019-05-22 PROCEDURE — 93571 IV DOP VEL&/PRESS C FLO 1ST: CPT | Mod: 26 | Performed by: INTERNAL MEDICINE

## 2019-05-22 PROCEDURE — 80048 BASIC METABOLIC PNL TOTAL CA: CPT

## 2019-05-22 PROCEDURE — C1894 INTRO/SHEATH, NON-LASER: HCPCS | Performed by: INTERNAL MEDICINE

## 2019-05-22 PROCEDURE — 25000125 ZZHC RX 250: Performed by: INTERNAL MEDICINE

## 2019-05-22 PROCEDURE — C1887 CATHETER, GUIDING: HCPCS | Performed by: INTERNAL MEDICINE

## 2019-05-22 PROCEDURE — 93572 IV DOP VEL&/PRESS C FLO EA: CPT | Performed by: INTERNAL MEDICINE

## 2019-05-22 PROCEDURE — 93454 CORONARY ARTERY ANGIO S&I: CPT | Performed by: INTERNAL MEDICINE

## 2019-05-22 PROCEDURE — 40000852 ZZH STATISTIC HEART CATH LAB OR EP LAB

## 2019-05-22 PROCEDURE — C1874 STENT, COATED/COV W/DEL SYS: HCPCS | Performed by: INTERNAL MEDICINE

## 2019-05-22 PROCEDURE — C9600 PERC DRUG-EL COR STENT SING: HCPCS | Mod: LD | Performed by: INTERNAL MEDICINE

## 2019-05-22 PROCEDURE — 25000128 H RX IP 250 OP 636: Performed by: INTERNAL MEDICINE

## 2019-05-22 PROCEDURE — 93010 ELECTROCARDIOGRAM REPORT: CPT | Mod: 76 | Performed by: INTERNAL MEDICINE

## 2019-05-22 PROCEDURE — 27210794 ZZH OR GENERAL SUPPLY STERILE: Performed by: INTERNAL MEDICINE

## 2019-05-22 PROCEDURE — 93005 ELECTROCARDIOGRAM TRACING: CPT

## 2019-05-22 PROCEDURE — 85730 THROMBOPLASTIN TIME PARTIAL: CPT

## 2019-05-22 PROCEDURE — 40000065 ZZH STATISTIC EKG NON-CHARGEABLE

## 2019-05-22 PROCEDURE — 93571 IV DOP VEL&/PRESS C FLO 1ST: CPT | Performed by: INTERNAL MEDICINE

## 2019-05-22 PROCEDURE — C1769 GUIDE WIRE: HCPCS | Performed by: INTERNAL MEDICINE

## 2019-05-22 PROCEDURE — 99152 MOD SED SAME PHYS/QHP 5/>YRS: CPT | Mod: GC | Performed by: INTERNAL MEDICINE

## 2019-05-22 PROCEDURE — 85027 COMPLETE CBC AUTOMATED: CPT

## 2019-05-22 PROCEDURE — 85347 COAGULATION TIME ACTIVATED: CPT

## 2019-05-22 PROCEDURE — 99153 MOD SED SAME PHYS/QHP EA: CPT | Performed by: INTERNAL MEDICINE

## 2019-05-22 PROCEDURE — 25800030 ZZH RX IP 258 OP 636: Performed by: NURSE PRACTITIONER

## 2019-05-22 DEVICE — STENT SYNERGY DRUG ELUTING 2.25X16MM  H7493926016220: Type: IMPLANTABLE DEVICE | Status: FUNCTIONAL

## 2019-05-22 RX ORDER — NALOXONE HYDROCHLORIDE 0.4 MG/ML
.1-.4 INJECTION, SOLUTION INTRAMUSCULAR; INTRAVENOUS; SUBCUTANEOUS
Status: DISCONTINUED | OUTPATIENT
Start: 2019-05-22 | End: 2019-05-22 | Stop reason: HOSPADM

## 2019-05-22 RX ORDER — SODIUM CHLORIDE 9 MG/ML
INJECTION, SOLUTION INTRAVENOUS CONTINUOUS
Status: DISCONTINUED | OUTPATIENT
Start: 2019-05-22 | End: 2019-05-22 | Stop reason: HOSPADM

## 2019-05-22 RX ORDER — LIDOCAINE 40 MG/G
CREAM TOPICAL
Status: DISCONTINUED | OUTPATIENT
Start: 2019-05-22 | End: 2019-05-22 | Stop reason: HOSPADM

## 2019-05-22 RX ORDER — NITROGLYCERIN 0.4 MG/1
0.4 TABLET SUBLINGUAL EVERY 5 MIN PRN
Status: DISCONTINUED | OUTPATIENT
Start: 2019-05-22 | End: 2019-05-22 | Stop reason: HOSPADM

## 2019-05-22 RX ORDER — HYDROCODONE BITARTRATE AND ACETAMINOPHEN 5; 325 MG/1; MG/1
1-2 TABLET ORAL EVERY 4 HOURS PRN
Status: DISCONTINUED | OUTPATIENT
Start: 2019-05-22 | End: 2019-05-22 | Stop reason: HOSPADM

## 2019-05-22 RX ORDER — ARGATROBAN 1 MG/ML
150 INJECTION, SOLUTION INTRAVENOUS
Status: DISCONTINUED | OUTPATIENT
Start: 2019-05-22 | End: 2019-05-22 | Stop reason: HOSPADM

## 2019-05-22 RX ORDER — FENTANYL CITRATE 50 UG/ML
INJECTION, SOLUTION INTRAMUSCULAR; INTRAVENOUS
Status: DISCONTINUED | OUTPATIENT
Start: 2019-05-22 | End: 2019-05-22 | Stop reason: HOSPADM

## 2019-05-22 RX ORDER — FENTANYL CITRATE 50 UG/ML
25-50 INJECTION, SOLUTION INTRAMUSCULAR; INTRAVENOUS
Status: DISCONTINUED | OUTPATIENT
Start: 2019-05-22 | End: 2019-05-22 | Stop reason: HOSPADM

## 2019-05-22 RX ORDER — NITROGLYCERIN 20 MG/100ML
.07-2 INJECTION INTRAVENOUS CONTINUOUS PRN
Status: DISCONTINUED | OUTPATIENT
Start: 2019-05-22 | End: 2019-05-22 | Stop reason: HOSPADM

## 2019-05-22 RX ORDER — ASPIRIN 81 MG/1
81 TABLET ORAL DAILY
Status: DISCONTINUED | OUTPATIENT
Start: 2019-05-22 | End: 2019-05-22 | Stop reason: HOSPADM

## 2019-05-22 RX ORDER — ATROPINE SULFATE 0.1 MG/ML
0.5 INJECTION INTRAVENOUS EVERY 5 MIN PRN
Status: DISCONTINUED | OUTPATIENT
Start: 2019-05-22 | End: 2019-05-22 | Stop reason: HOSPADM

## 2019-05-22 RX ORDER — NITROGLYCERIN 5 MG/ML
VIAL (ML) INTRAVENOUS
Status: DISCONTINUED | OUTPATIENT
Start: 2019-05-22 | End: 2019-05-22 | Stop reason: HOSPADM

## 2019-05-22 RX ORDER — IOPAMIDOL 755 MG/ML
INJECTION, SOLUTION INTRAVASCULAR
Status: DISCONTINUED | OUTPATIENT
Start: 2019-05-22 | End: 2019-05-22 | Stop reason: HOSPADM

## 2019-05-22 RX ORDER — EPTIFIBATIDE 2 MG/ML
2 INJECTION, SOLUTION INTRAVENOUS CONTINUOUS PRN
Status: DISCONTINUED | OUTPATIENT
Start: 2019-05-22 | End: 2019-05-22 | Stop reason: HOSPADM

## 2019-05-22 RX ORDER — ADENOSINE 3 MG/ML
INJECTION, SOLUTION INTRAVENOUS
Status: DISCONTINUED | OUTPATIENT
Start: 2019-05-22 | End: 2019-05-22 | Stop reason: HOSPADM

## 2019-05-22 RX ORDER — HEPARIN SODIUM 1000 [USP'U]/ML
INJECTION, SOLUTION INTRAVENOUS; SUBCUTANEOUS
Status: DISCONTINUED | OUTPATIENT
Start: 2019-05-22 | End: 2019-05-22 | Stop reason: HOSPADM

## 2019-05-22 RX ORDER — FLUMAZENIL 0.1 MG/ML
0.2 INJECTION, SOLUTION INTRAVENOUS
Status: DISCONTINUED | OUTPATIENT
Start: 2019-05-22 | End: 2019-05-22 | Stop reason: HOSPADM

## 2019-05-22 RX ORDER — ARGATROBAN 1 MG/ML
350 INJECTION, SOLUTION INTRAVENOUS
Status: DISCONTINUED | OUTPATIENT
Start: 2019-05-22 | End: 2019-05-22 | Stop reason: HOSPADM

## 2019-05-22 RX ORDER — VERAPAMIL HYDROCHLORIDE 2.5 MG/ML
INJECTION, SOLUTION INTRAVENOUS
Status: DISCONTINUED | OUTPATIENT
Start: 2019-05-22 | End: 2019-05-22 | Stop reason: HOSPADM

## 2019-05-22 RX ORDER — EPTIFIBATIDE 2 MG/ML
180 INJECTION, SOLUTION INTRAVENOUS EVERY 10 MIN PRN
Status: DISCONTINUED | OUTPATIENT
Start: 2019-05-22 | End: 2019-05-22 | Stop reason: HOSPADM

## 2019-05-22 RX ORDER — ACETAMINOPHEN 325 MG/1
650 TABLET ORAL EVERY 4 HOURS PRN
Status: DISCONTINUED | OUTPATIENT
Start: 2019-05-22 | End: 2019-05-22 | Stop reason: HOSPADM

## 2019-05-22 RX ORDER — NALOXONE HYDROCHLORIDE 0.4 MG/ML
.2-.4 INJECTION, SOLUTION INTRAMUSCULAR; INTRAVENOUS; SUBCUTANEOUS
Status: DISCONTINUED | OUTPATIENT
Start: 2019-05-22 | End: 2019-05-22 | Stop reason: HOSPADM

## 2019-05-22 RX ORDER — NOREPINEPHRINE BITARTRATE 0.06 MG/ML
.03-.4 INJECTION, SOLUTION INTRAVENOUS CONTINUOUS PRN
Status: DISCONTINUED | OUTPATIENT
Start: 2019-05-22 | End: 2019-05-22 | Stop reason: HOSPADM

## 2019-05-22 RX ORDER — DOPAMINE HYDROCHLORIDE 160 MG/100ML
2-20 INJECTION, SOLUTION INTRAVENOUS CONTINUOUS PRN
Status: DISCONTINUED | OUTPATIENT
Start: 2019-05-22 | End: 2019-05-22 | Stop reason: HOSPADM

## 2019-05-22 RX ORDER — DOBUTAMINE HYDROCHLORIDE 200 MG/100ML
2-20 INJECTION INTRAVENOUS CONTINUOUS PRN
Status: DISCONTINUED | OUTPATIENT
Start: 2019-05-22 | End: 2019-05-22 | Stop reason: HOSPADM

## 2019-05-22 RX ADMIN — NITROGLYCERIN 200 MCG: 5 INJECTION, SOLUTION INTRAVENOUS at 09:43

## 2019-05-22 RX ADMIN — FENTANYL CITRATE 50 MCG: 50 INJECTION, SOLUTION INTRAMUSCULAR; INTRAVENOUS at 08:53

## 2019-05-22 RX ADMIN — LIDOCAINE HYDROCHLORIDE 1 ML: 10 INJECTION, SOLUTION EPIDURAL; INFILTRATION; INTRACAUDAL; PERINEURAL at 08:59

## 2019-05-22 RX ADMIN — MIDAZOLAM 1 MG: 1 INJECTION INTRAMUSCULAR; INTRAVENOUS at 08:54

## 2019-05-22 RX ADMIN — VERAPAMIL HYDROCHLORIDE 2.5 MG: 2.5 INJECTION, SOLUTION INTRAVENOUS at 09:03

## 2019-05-22 RX ADMIN — ADENOSINE 120 MCG: 3 INJECTION, SOLUTION INTRAVENOUS at 09:25

## 2019-05-22 RX ADMIN — MIDAZOLAM 1 MG: 1 INJECTION INTRAMUSCULAR; INTRAVENOUS at 08:58

## 2019-05-22 RX ADMIN — IOPAMIDOL 200 ML: 755 INJECTION, SOLUTION INTRAVENOUS at 09:49

## 2019-05-22 RX ADMIN — ADENOSINE 180 MCG: 3 INJECTION, SOLUTION INTRAVENOUS at 09:25

## 2019-05-22 RX ADMIN — ADENOSINE 120 MCG: 3 INJECTION, SOLUTION INTRAVENOUS at 09:22

## 2019-05-22 RX ADMIN — SODIUM CHLORIDE: 9 INJECTION, SOLUTION INTRAVENOUS at 07:00

## 2019-05-22 RX ADMIN — NITROGLYCERIN 200 MCG: 5 INJECTION, SOLUTION INTRAVENOUS at 09:40

## 2019-05-22 RX ADMIN — HEPARIN SODIUM 8000 UNITS: 1000 INJECTION, SOLUTION INTRAVENOUS; SUBCUTANEOUS at 09:03

## 2019-05-22 RX ADMIN — FENTANYL CITRATE 50 MCG: 50 INJECTION, SOLUTION INTRAMUSCULAR; INTRAVENOUS at 09:05

## 2019-05-22 RX ADMIN — NITROGLYCERIN 200 MCG: 5 INJECTION, SOLUTION INTRAVENOUS at 09:08

## 2019-05-22 RX ADMIN — NITROGLYCERIN 200 MCG: 5 INJECTION, SOLUTION INTRAVENOUS at 09:03

## 2019-05-22 RX ADMIN — FENTANYL CITRATE 50 MCG: 50 INJECTION, SOLUTION INTRAMUSCULAR; INTRAVENOUS at 08:58

## 2019-05-22 RX ADMIN — MIDAZOLAM 1 MG: 1 INJECTION INTRAMUSCULAR; INTRAVENOUS at 09:05

## 2019-05-22 RX ADMIN — ADENOSINE 180 MCG: 3 INJECTION, SOLUTION INTRAVENOUS at 09:22

## 2019-05-22 ASSESSMENT — MIFFLIN-ST. JEOR: SCORE: 1699.02

## 2019-05-22 NOTE — PROGRESS NOTES
Pt up with stand by assist and walked to bathroom in the jerry.  Tolerated well. Pt denies chest pain, nausea or vomiting. Tolerating po food or fluids post procedure. Voided good amount.

## 2019-05-22 NOTE — Clinical Note
Stent deployed in the left anterior descending diagonal. Max pressure = 12 lai. Total duration = 27 seconds.

## 2019-05-22 NOTE — DISCHARGE INSTRUCTIONS
Cardiac Stent Discharge Instructions - Radial    After you go home:      Have an adult stay with you until tomorrow.    Drink extra fluids for 2 days.    You may resume your normal diet.    No smoking       For 24 hours - due to the sedation you received:    Relax and take it easy.    Do NOT make any important or legal decisions.    Do NOT drive or operate machines at home or at work.    Do NOT drink alcohol.    Care of Wrist Puncture Site:      For the first 24 hrs - check the puncture site every 1-2 hours while awake.    It is normal to have soreness at the puncture site and mild tingling in your hand for up to 3 days.    Remove the bandaid after 24 hours. If there is minor oozing, apply another bandaid and remove it after 12 hours.    You may shower tomorrow.  Do NOT take a bath, or use a hot tub or pool for at least 3 days. Do NOT scrub the site. Do not use lotion or powder near the puncture site.           Activity:          For 2 days:     do not use your hand or arm to support your weight (such as rising from a chair)     do not bend your wrist (such as lifting a garage door).    do not lift more than 5 pounds or exercise your arm (such as tennis, golf or bowling).    Do NOT do any heavy activity such as exercise, lifting, or straining.     Bleeding:      If you start bleeding from the site in your wrist, sit down and press firmly on/above the site for 10 minutes.     Once bleeding stops, keep arm still for 2 hours.     Call [facility] as soon as you can.       Call 911 right away if you have heavy bleeding or bleeding that does not stop.      Medicines:       do not stop taking Brilinta until you talk to your cardiologist.                    Take your medications,  unless your provider tells you not to.      If you have stopped any medicines, check with your provider about when to restart them.    Follow Up Appointments:      Follow up with [facility] Nurse Practitioner at [facility] of patient  preference in 7-10 days.    Cardiac Rehab will contact you for follow up care.    Call the clinic if:      You have a large or growing hard lump around the site.    The site is red, swollen, hot or tender.    Blood or fluid is draining from the site.    You have chills or a fever greater than 101 F (38 C).    Your arm feels numb, cool or changes color.    You have hives, a rash or unusual itching.    Any questions or concerns.    Other Instructions:      carry your stent card with you at all times.      [facility] [place]:    [phone] [facility] (7 days a week)

## 2019-05-22 NOTE — PROGRESS NOTES
Pre procedure plan of care reviewed with pt prior to sedation.  All questions answered and pt appears to accept and understand.

## 2019-05-22 NOTE — PROVIDER NOTIFICATION
TR band removed at 1300 after zero pressure for 30 minutes - radial artery bleeding.  Manual pressure held and TR band replaced with 7 ml air to band. Cath lab RN Corinne notified to inform MD--Dr Epstein.  Currently MD is in procedure with another pt.

## 2019-05-22 NOTE — PROGRESS NOTES
TR band removed after 30 minutes of TR band at zero. Manual pressure held while TR band placed with assistance.  & ml air added to band.  Radial pulse remains palpable.

## 2019-05-22 NOTE — PROGRESS NOTES
All air out of TR and TR band off x 30 min with no bleeding. Up to bathroom to void. IV and tele DCD. Pt dressed. No c/o.  1540 Discharged to door #1 to family.

## 2019-05-22 NOTE — Clinical Note
The first balloon was inserted into the left anterior descending and left anterior descending diagonal.Max pressure = 10 lai. Total duration = 14 seconds.     Max pressure = 10 lai. Total duration = 18 seconds.    Balloon reinflated a second time: Max pressure = 10 lai. Total duration = 18 seconds.

## 2019-05-22 NOTE — PROGRESS NOTES
Pt returned to [facility] #17 from cath lab.  Left radial TR band in place- no bleeding or hematoma  Noted. CWMS intact and radial pulse palpable.

## 2019-05-22 NOTE — Clinical Note
The first balloon was inserted into the left anterior descending and left anterior descending diagonal.Max pressure = 22 lai. Total duration = 45 seconds.     Max pressure = 18 lai. Total duration = 22 seconds.    Balloon reinflated a second time: Max pressure = 18 lai. Total duration = 22 seconds.  Balloon reinflated a third time: Max pressure = 30 lai. Total duration = 30 seconds.

## 2019-05-22 NOTE — PROGRESS NOTES
Care Suites Admission Nursing Note    Reason for admission: staged coronary PCI  CS arrival time: 0630  Accompanied by: self  Name/phone of DC : Cheri sister  Medications held: no  Consent signed: no  Abnormal assessment/labs: awaiting results  If abnormal, provider notified: awaiting results  Education/questions answered: yes  Plan: plan of care reviewed with pt

## 2019-05-23 ENCOUNTER — TELEPHONE (OUTPATIENT)
Dept: CARDIOLOGY | Facility: CLINIC | Age: 54
End: 2019-05-23

## 2019-05-23 DIAGNOSIS — I25.10 CORONARY ARTERY DISEASE INVOLVING NATIVE CORONARY ARTERY OF NATIVE HEART WITHOUT ANGINA PECTORIS: Primary | ICD-10-CM

## 2019-05-23 NOTE — TELEPHONE ENCOUNTER
Called patient post PCI to LAD yesterday. He has left radial access and states this is doing well. He still has armboard on and I encouraged him to remove that. Reminded him of 10 pounds lifting restriction for the next week including no golfing.    He denies chest pain. He is taking ASA and Brilinta as prescribed.    He has intermittent creatinine elevation so I will check BMP at return visit. He requests to reschedule from 5-29 to 5-31 so asked scheduling to call him to do so.      He will call back with any questions or concerns

## 2019-05-24 LAB
INTERPRETATION ECG - MUSE: NORMAL
INTERPRETATION ECG - MUSE: NORMAL

## 2019-05-31 ENCOUNTER — OFFICE VISIT (OUTPATIENT)
Dept: CARDIOLOGY | Facility: CLINIC | Age: 54
End: 2019-05-31
Payer: COMMERCIAL

## 2019-05-31 VITALS
SYSTOLIC BLOOD PRESSURE: 132 MMHG | BODY MASS INDEX: 24.24 KG/M2 | HEIGHT: 72 IN | DIASTOLIC BLOOD PRESSURE: 80 MMHG | WEIGHT: 179 LBS | HEART RATE: 58 BPM

## 2019-05-31 DIAGNOSIS — I25.10 CORONARY ARTERY DISEASE INVOLVING NATIVE CORONARY ARTERY OF NATIVE HEART WITHOUT ANGINA PECTORIS: ICD-10-CM

## 2019-05-31 DIAGNOSIS — I25.10 CORONARY ARTERY DISEASE INVOLVING NATIVE CORONARY ARTERY OF NATIVE HEART WITHOUT ANGINA PECTORIS: Primary | ICD-10-CM

## 2019-05-31 LAB
ANION GAP SERPL CALCULATED.3IONS-SCNC: 12.4 MMOL/L (ref 6–17)
BUN SERPL-MCNC: 10 MG/DL (ref 7–30)
CALCIUM SERPL-MCNC: 10.6 MG/DL (ref 8.5–10.5)
CHLORIDE SERPL-SCNC: 103 MMOL/L (ref 98–107)
CO2 SERPL-SCNC: 29 MMOL/L (ref 23–29)
CREAT SERPL-MCNC: 1.29 MG/DL (ref 0.7–1.3)
GFR SERPL CREATININE-BSD FRML MDRD: 58 ML/MIN/{1.73_M2}
GLUCOSE SERPL-MCNC: 113 MG/DL (ref 70–105)
POTASSIUM SERPL-SCNC: 4.4 MMOL/L (ref 3.5–5.1)
SODIUM SERPL-SCNC: 140 MMOL/L (ref 136–145)

## 2019-05-31 PROCEDURE — 36415 COLL VENOUS BLD VENIPUNCTURE: CPT | Performed by: NURSE PRACTITIONER

## 2019-05-31 PROCEDURE — 80048 BASIC METABOLIC PNL TOTAL CA: CPT | Performed by: NURSE PRACTITIONER

## 2019-05-31 PROCEDURE — 99214 OFFICE O/P EST MOD 30 MIN: CPT | Performed by: NURSE PRACTITIONER

## 2019-05-31 RX ORDER — METOPROLOL SUCCINATE 25 MG/1
25 TABLET, EXTENDED RELEASE ORAL DAILY
Qty: 90 TABLET | Refills: 3 | Status: SHIPPED | OUTPATIENT
Start: 2019-05-31 | End: 2019-08-23

## 2019-05-31 RX ORDER — LISINOPRIL 10 MG/1
10 TABLET ORAL DAILY
Qty: 90 TABLET | Refills: 3 | Status: SHIPPED | OUTPATIENT
Start: 2019-05-31 | End: 2019-10-23

## 2019-05-31 ASSESSMENT — MIFFLIN-ST. JEOR: SCORE: 1694.94

## 2019-05-31 NOTE — LETTER
5/31/2019    Physician No Ref-Primary  No address on file    RE: Matty Demarco Bambi       Dear Colleague,    I had the pleasure of seeing Matty Lacy in the [facility].    HPI and Plan:   I had the pleasure of seeing Matty Lacy today in cardiology clinic follow up. He is a pleasant 54 year old patient Dr. Epstein treated in the cath lab for STEMI on April 6, 2019.  Prior to that he did have a history of coronary artery disease with 2 previous MIs with previous RCA stenting, tobacco abuse and prior to the STEMI he had continued all of his cardiac medications.    He presented with inferior STEMI on May 6 due to 100% occluded mid RCA, that was treated with a 3.0 x 20 mm, 3.0 x 12 mm drug-eluting stent.  The LV end-diastolic pressure was 10 mmHg without evidence of aortic stenosis.  His echocardiogram showed a low normal LV systolic function with a EF of 50 to 55%.  In follow-up he was doing well and I sent him back for a staged intervention.  On May 22 he went back for staged intervention to D1 with a 2.25 x 16 mm drug-eluting stent.  FFR of the mid LAD and ostial circumflex showed non-flow-limiting disease (0.84 and 0.93 respectively).  He was placed on ticagrelor and aspirin following his first cath.  He is on beta-blockade and ACE inhibitor therapy. He started statin therapy, his LDL was 141 and his triglycerides 363.  He is working to improve his diet and his cut down his soda intake from 10 cans a day to 1.    Today he is doing well.  He quit smoking 2 days before his STEMI and has not resumed.  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  He does not intend to participate in cardiac rehab.  He has a history of running and wants to get back into it, may be due half marathon. Brilinta is expensive for him, $300 a month.  I gave him a month of samples and told him that we could transition him to Plavix.    Labs Reviewed: Cholesterol 249, HDL 35, ,  triglycerides 363.     Physical Exam  Please see Below     Assessment and Plan  1.  Premature coronary artery disease.  He had his first myocardial infarction in his 40s, last month at age 54 he had a another inferior STEMI.  He has a long history of smoking but quit 2 days before his last and inferior STEMI.  He had been off of his medications but appears motivated now to take them.  He is continuing on Brilinta 90 mg twice daily, aspirin, Lopressor and lisinopril.  He is finding Brilinta expensive, I gave him month of free samples and a coupon card so he can look into getting it less expensively.  If he finds that he cannot get it for reduced cost I have asked him to call us and we will switch him to Plavix with a loading dose of 300 mg followed by 75 mg daily.  He does not plan to participate in cardiac rehab but does plan to start exercising on his own.  He says since intervention he has not had any chest pain or shortness of breath and he feels really good.  2.  Hyperlipidemia.  Is working on diet and exercise and hoping that he can lower his triglycerides.  We will recheck his LDL when he sees Dr. Moscoso will recommend he see for follow-up as Dr. Epstein has no openings until October.  3.  Hypertension.  His blood pressure is a little on the high side today.  Increase his lisinopril to 10 mg daily.  He asked to switch to once a day betab blocker so I put him on 25 of Toprol-XL.  I will repeat a BMP when he sees Dr. Moscoso in follow-up.    Thank you for allowing me to care for Matty Zamoramont today.    CURT Whiteside, CNP  Cardiology    Voice recognition software was used for this note, I have reviewed this note, but errors may have been missed.    Orders Placed This Encounter   Procedures     Lipid Profile     Basic metabolic panel     Follow-Up with Cardiologist     Echocardiogram Complete     Orders Placed This Encounter   Medications     lisinopril (PRINIVIL/ZESTRIL) 10 MG tablet     Sig: Take 1  tablet (10 mg) by mouth daily     Dispense:  90 tablet     Refill:  3     metoprolol succinate ER (TOPROL XL) 25 MG 24 hr tablet     Sig: Take 1 tablet (25 mg) by mouth daily     Dispense:  90 tablet     Refill:  3     Medications Discontinued During This Encounter   Medication Reason     lisinopril (PRINIVIL/ZESTRIL) 5 MG tablet Reorder     metoprolol tartrate (LOPRESSOR) 25 MG tablet          CURRENT MEDICATIONS:  Current Outpatient Medications   Medication Sig Dispense Refill     aspirin (ASA) 81 MG EC tablet Take 1 tablet (81 mg) by mouth daily 30 tablet 0     atorvastatin (LIPITOR) 40 MG tablet Take 1 tablet (40 mg) by mouth daily 90 tablet 3     lisinopril (PRINIVIL/ZESTRIL) 10 MG tablet Take 1 tablet (10 mg) by mouth daily 90 tablet 3     metoprolol succinate ER (TOPROL XL) 25 MG 24 hr tablet Take 1 tablet (25 mg) by mouth daily 90 tablet 3     nitroGLYcerin (NITROSTAT) 0.4 MG sublingual tablet For chest pain place 1 tablet under the tongue every 5 minutes for 3 doses. If symptoms persist 5 minutes after 1st dose call 911. 10 tablet 0     ticagrelor (BRILINTA) 90 MG tablet Take 1 tablet (90 mg) by mouth every 12 hours 180 tablet 3       ALLERGIES   No Known Allergies    PAST MEDICAL HISTORY:  Past Medical History:   Diagnosis Date     Myocardial infarction (H)        PAST SURGICAL HISTORY:  Past Surgical History:   Procedure Laterality Date     CV HEART CATHETERIZATION WITH POSSIBLE INTERVENTION N/A 5/6/2019    Procedure: Heart Catheterization with possible Intervention;  Surgeon: Ashanti Epstein MD;  Location:  HEART CARDIAC CATH LAB     CV HEART CATHETERIZATION WITH POSSIBLE INTERVENTION N/A 5/22/2019    Procedure: Heart Catheterization with Possible Intervention;  Surgeon: Ashanti Epstein MD;  Location:  HEART CARDIAC CATH LAB     CV LEFT HEART CATH N/A 5/6/2019    Procedure: Left Heart Cath;  Surgeon: Ashanti Epstein MD;  Location:  HEART CARDIAC CATH LAB     CV PCI STENT DRUG  ELUTING N/A 2019    Procedure: PCI Stent Drug Eluting;  Surgeon: Ashanti Epstein MD;  Location:  HEART CARDIAC CATH LAB     CV PCI STENT DRUG ELUTING N/A 2019    Procedure: PCI Stent Drug Eluting;  Surgeon: Ashanti Epstein MD;  Location:  HEART CARDIAC CATH LAB       FAMILY HISTORY:  Family History   Problem Relation Age of Onset     Coronary Artery Disease Maternal Grandfather        SOCIAL HISTORY:  Social History     Socioeconomic History     Marital status: Single     Spouse name: None     Number of children: None     Years of education: None     Highest education level: None   Occupational History     None   Social Needs     Financial resource strain: None     Food insecurity:     Worry: None     Inability: None     Transportation needs:     Medical: None     Non-medical: None   Tobacco Use     Smoking status: Former Smoker     Packs/day: 0.50     Years: 6.00     Pack years: 3.00     Types: Cigarettes     Last attempt to quit: 2019     Years since quittin.0     Smokeless tobacco: Never Used   Substance and Sexual Activity     Alcohol use: Yes     Comment: occ     Drug use: Never     Sexual activity: None   Lifestyle     Physical activity:     Days per week: None     Minutes per session: None     Stress: None   Relationships     Social connections:     Talks on phone: None     Gets together: None     Attends [employer] service: None     Active member of club or organization: None     Attends meetings of clubs or organizations: None     Relationship status: None     Intimate partner violence:     Fear of current or ex partner: None     Emotionally abused: None     Physically abused: None     Forced sexual activity: None   Other Topics Concern     Parent/sibling w/ CABG, MI or angioplasty before 65F 55M? Not Asked   Social History Narrative     None       Review of Systems:  Skin:  Negative       Eyes:  Positive for glasses night driving   ENT:  Negative      Respiratory:   Negative       Cardiovascular:  Negative      Gastroenterology: Negative      Genitourinary:  Negative      Musculoskeletal:  Negative      Neurologic:  Negative      Psychiatric:  Negative      Heme/Lymph/Imm:  Negative      Endocrine:  Negative        Physical Exam:  Vitals: /80   Pulse 58   Ht 1.829 m (6')   Wt 81.2 kg (179 lb)   BMI 24.28 kg/m       Constitutional:  cooperative;in no acute distress appears anxious      Skin:  warm and dry to the touch          Head:  normocephalic        Eyes:  pupils equal and round        Lymph:      ENT:    poor dentition      Neck:  JVP normal        Respiratory:  clear to auscultation         Cardiac: regular rhythm;normal S1 and S2                pulses full and equal                                        GI:           Extremities and Muscular Skeletal:  no edema         radial site healed, good pulses    Neurological:  no gross motor deficits;affect appropriate        Psych:  Alert and Oriented x 3 Anxious  Encounter Diagnosis   Name Primary?     Coronary artery disease involving native coronary artery of native heart without angina pectoris Yes       Recent Lab Results:  LIPID RESULTS:  Lab Results   Component Value Date    CHOL 249 (H) 05/06/2019    HDL 35 (L) 05/06/2019     (H) 05/06/2019    TRIG 363 (H) 05/06/2019       LIVER ENZYME RESULTS:  No results found for: AST, ALT    CBC RESULTS:  Lab Results   Component Value Date    WBC 7.7 05/22/2019    RBC 4.70 05/22/2019    HGB 15.6 05/22/2019    HCT 43.8 05/22/2019    MCV 93 05/22/2019    MCH 33.2 (H) 05/22/2019    MCHC 35.6 05/22/2019    RDW 11.9 05/22/2019     05/22/2019       BMP RESULTS:  Lab Results   Component Value Date     05/31/2019    POTASSIUM 4.4 05/31/2019    CHLORIDE 103 05/31/2019    CO2 29 05/31/2019    ANIONGAP 12.4 05/31/2019     (H) 05/31/2019    BUN 10 05/31/2019    CR 1.29 05/31/2019    GFRESTIMATED 58 (L) 05/31/2019    GFRESTBLACK 71 05/31/2019    CHRISTINE 10.6 (H)  05/31/2019        A1C RESULTS:  No results found for: A1C    INR RESULTS:  Lab Results   Component Value Date    INR 1.15 (H) 05/22/2019    INR 0.93 05/06/2019           CC  No referring provider defined for this encounter.                    Thank you for allowing me to participate in the care of your patient.      Sincerely,     CURT [facility]    cc:   No referring provider defined for this encounter.

## 2019-05-31 NOTE — PATIENT INSTRUCTIONS
When you get low on Brillinta, call me and we will switch you to plavix/clopedigril.    When you start Plavix, you taking a loading dose of 300 mg (4 tablets) then 75 mg daily.    Increase Lisinopril to 10 mg    Switch to 25 mg of long acting Toprol XL 25 mg daily.    [facility]  [phone]

## 2019-05-31 NOTE — PROGRESS NOTES
HPI and Plan:   I had the pleasure of seeing Matty Lacy today in cardiology clinic follow up. He is a pleasant 54 year old patient Dr. Epstein treated in the cath lab for STEMI on April 6, 2019.  Prior to that he did have a history of coronary artery disease with 2 previous MIs with previous RCA stenting, tobacco abuse and prior to the STEMI he had continued all of his cardiac medications.    He presented with inferior STEMI on May 6 due to 100% occluded mid RCA, that was treated with a 3.0 x 20 mm, 3.0 x 12 mm drug-eluting stent.  The LV end-diastolic pressure was 10 mmHg without evidence of aortic stenosis.  His echocardiogram showed a low normal LV systolic function with a EF of 50 to 55%.  In follow-up he was doing well and I sent him back for a staged intervention.  On May 22 he went back for staged intervention to D1 with a 2.25 x 16 mm drug-eluting stent.  FFR of the mid LAD and ostial circumflex showed non-flow-limiting disease (0.84 and 0.93 respectively).  He was placed on ticagrelor and aspirin following his first cath.  He is on beta-blockade and ACE inhibitor therapy. He started statin therapy, his LDL was 141 and his triglycerides 363.  He is working to improve his diet and his cut down his soda intake from 10 cans a day to 1.    Today he is doing well.  He quit smoking 2 days before his STEMI and has not resumed.  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  He does not intend to participate in cardiac rehab.  He has a history of running and wants to get back into it, may be due half marathon. Brilinta is expensive for him, $300 a month.  I gave him a month of samples and told him that we could transition him to Plavix.    Labs Reviewed: Cholesterol 249, HDL 35, , triglycerides 363.     Physical Exam  Please see Below     Assessment and Plan  1.  Premature coronary artery disease.  He had his first myocardial infarction in his 40s, last month at age 54 he had a another inferior STEMI.   He has a long history of smoking but quit 2 days before his last and inferior STEMI.  He had been off of his medications but appears motivated now to take them.  He is continuing on Brilinta 90 mg twice daily, aspirin, Lopressor and lisinopril.  He is finding Brilinta expensive, I gave him month of free samples and a coupon card so he can look into getting it less expensively.  If he finds that he cannot get it for reduced cost I have asked him to call us and we will switch him to Plavix with a loading dose of 300 mg followed by 75 mg daily.  He does not plan to participate in cardiac rehab but does plan to start exercising on his own.  He says since intervention he has not had any chest pain or shortness of breath and he feels really good.  2.  Hyperlipidemia.  Is working on diet and exercise and hoping that he can lower his triglycerides.  We will recheck his LDL when he sees Dr. Moscoso will recommend he see for follow-up as Dr. Epstein has no openings until October.  3.  Hypertension.  His blood pressure is a little on the high side today.  Increase his lisinopril to 10 mg daily.  He asked to switch to once a day betab blocker so I put him on 25 of Toprol-XL.  I will repeat a BMP when he sees Dr. Moscoso in follow-up.    Thank you for allowing me to care for Matty Lacy today.    CURT Whiteside, CNP  Cardiology    Voice recognition software was used for this note, I have reviewed this note, but errors may have been missed.    Orders Placed This Encounter   Procedures     Lipid Profile     Basic metabolic panel     Follow-Up with Cardiologist     Echocardiogram Complete     Orders Placed This Encounter   Medications     lisinopril (PRINIVIL/ZESTRIL) 10 MG tablet     Sig: Take 1 tablet (10 mg) by mouth daily     Dispense:  90 tablet     Refill:  3     metoprolol succinate ER (TOPROL XL) 25 MG 24 hr tablet     Sig: Take 1 tablet (25 mg) by mouth daily     Dispense:  90 tablet     Refill:  3      Medications Discontinued During This Encounter   Medication Reason     lisinopril (PRINIVIL/ZESTRIL) 5 MG tablet Reorder     metoprolol tartrate (LOPRESSOR) 25 MG tablet          CURRENT MEDICATIONS:  Current Outpatient Medications   Medication Sig Dispense Refill     aspirin (ASA) 81 MG EC tablet Take 1 tablet (81 mg) by mouth daily 30 tablet 0     atorvastatin (LIPITOR) 40 MG tablet Take 1 tablet (40 mg) by mouth daily 90 tablet 3     lisinopril (PRINIVIL/ZESTRIL) 10 MG tablet Take 1 tablet (10 mg) by mouth daily 90 tablet 3     metoprolol succinate ER (TOPROL XL) 25 MG 24 hr tablet Take 1 tablet (25 mg) by mouth daily 90 tablet 3     nitroGLYcerin (NITROSTAT) 0.4 MG sublingual tablet For chest pain place 1 tablet under the tongue every 5 minutes for 3 doses. If symptoms persist 5 minutes after 1st dose call 911. 10 tablet 0     ticagrelor (BRILINTA) 90 MG tablet Take 1 tablet (90 mg) by mouth every 12 hours 180 tablet 3       ALLERGIES   No Known Allergies    PAST MEDICAL HISTORY:  Past Medical History:   Diagnosis Date     Myocardial infarction (H)        PAST SURGICAL HISTORY:  Past Surgical History:   Procedure Laterality Date     CV HEART CATHETERIZATION WITH POSSIBLE INTERVENTION N/A 5/6/2019    Procedure: Heart Catheterization with possible Intervention;  Surgeon: Ashanti Epstein MD;  Location:  HEART CARDIAC CATH LAB     CV HEART CATHETERIZATION WITH POSSIBLE INTERVENTION N/A 5/22/2019    Procedure: Heart Catheterization with Possible Intervention;  Surgeon: Ashanti Epstein MD;  Location:  HEART CARDIAC CATH LAB     CV LEFT HEART CATH N/A 5/6/2019    Procedure: Left Heart Cath;  Surgeon: Ashanti Epstein MD;  Location:  HEART CARDIAC CATH LAB     CV PCI STENT DRUG ELUTING N/A 5/6/2019    Procedure: PCI Stent Drug Eluting;  Surgeon: Ashanti Epstein MD;  Location:  HEART CARDIAC CATH LAB     CV PCI STENT DRUG ELUTING N/A 5/22/2019    Procedure: PCI Stent Drug Eluting;   Surgeon: Ashanti Epstein MD;  Location:  HEART CARDIAC CATH LAB       FAMILY HISTORY:  Family History   Problem Relation Age of Onset     Coronary Artery Disease Maternal Grandfather        SOCIAL HISTORY:  Social History     Socioeconomic History     Marital status: Single     Spouse name: None     Number of children: None     Years of education: None     Highest education level: None   Occupational History     None   Social Needs     Financial resource strain: None     Food insecurity:     Worry: None     Inability: None     Transportation needs:     Medical: None     Non-medical: None   Tobacco Use     Smoking status: Former Smoker     Packs/day: 0.50     Years: 6.00     Pack years: 3.00     Types: Cigarettes     Last attempt to quit: 2019     Years since quittin.0     Smokeless tobacco: Never Used   Substance and Sexual Activity     Alcohol use: Yes     Comment: occ     Drug use: Never     Sexual activity: None   Lifestyle     Physical activity:     Days per week: None     Minutes per session: None     Stress: None   Relationships     Social connections:     Talks on phone: None     Gets together: None     Attends Rastafari service: None     Active member of club or organization: None     Attends meetings of clubs or organizations: None     Relationship status: None     Intimate partner violence:     Fear of current or ex partner: None     Emotionally abused: None     Physically abused: None     Forced sexual activity: None   Other Topics Concern     Parent/sibling w/ CABG, MI or angioplasty before 65F 55M? Not Asked   Social History Narrative     None       Review of Systems:  Skin:  Negative       Eyes:  Positive for glasses night driving   ENT:  Negative      Respiratory:  Negative       Cardiovascular:  Negative      Gastroenterology: Negative      Genitourinary:  Negative      Musculoskeletal:  Negative      Neurologic:  Negative      Psychiatric:  Negative      Heme/Lymph/Imm:  Negative       Endocrine:  Negative        Physical Exam:  Vitals: /80   Pulse 58   Ht 1.829 m (6')   Wt 81.2 kg (179 lb)   BMI 24.28 kg/m      Constitutional:  cooperative;in no acute distress appears anxious      Skin:  warm and dry to the touch          Head:  normocephalic        Eyes:  pupils equal and round        Lymph:      ENT:    poor dentition      Neck:  JVP normal        Respiratory:  clear to auscultation         Cardiac: regular rhythm;normal S1 and S2                pulses full and equal                                        GI:           Extremities and Muscular Skeletal:  no edema         radial site healed, good pulses    Neurological:  no gross motor deficits;affect appropriate        Psych:  Alert and Oriented x 3 Anxious  Encounter Diagnosis   Name Primary?     Coronary artery disease involving native coronary artery of native heart without angina pectoris Yes       Recent Lab Results:  LIPID RESULTS:  Lab Results   Component Value Date    CHOL 249 (H) 05/06/2019    HDL 35 (L) 05/06/2019     (H) 05/06/2019    TRIG 363 (H) 05/06/2019       LIVER ENZYME RESULTS:  No results found for: AST, ALT    CBC RESULTS:  Lab Results   Component Value Date    WBC 7.7 05/22/2019    RBC 4.70 05/22/2019    HGB 15.6 05/22/2019    HCT 43.8 05/22/2019    MCV 93 05/22/2019    MCH 33.2 (H) 05/22/2019    MCHC 35.6 05/22/2019    RDW 11.9 05/22/2019     05/22/2019       BMP RESULTS:  Lab Results   Component Value Date     05/31/2019    POTASSIUM 4.4 05/31/2019    CHLORIDE 103 05/31/2019    CO2 29 05/31/2019    ANIONGAP 12.4 05/31/2019     (H) 05/31/2019    BUN 10 05/31/2019    CR 1.29 05/31/2019    GFRESTIMATED 58 (L) 05/31/2019    GFRESTBLACK 71 05/31/2019    CHRISTINE 10.6 (H) 05/31/2019        A1C RESULTS:  No results found for: A1C    INR RESULTS:  Lab Results   Component Value Date    INR 1.15 (H) 05/22/2019    INR 0.93 05/06/2019           CC  No referring provider defined for this  encounter.

## 2019-06-29 ENCOUNTER — HOSPITAL ENCOUNTER (EMERGENCY)
Facility: CLINIC | Age: 54
Discharge: HOME | End: 2019-06-29
Attending: EMERGENCY MEDICINE | Admitting: EMERGENCY MEDICINE
Payer: COMMERCIAL

## 2019-06-29 ENCOUNTER — NURSE TRIAGE (OUTPATIENT)
Dept: NURSING | Facility: CLINIC | Age: 54
End: 2019-06-29

## 2019-06-29 ENCOUNTER — APPOINTMENT (OUTPATIENT)
Dept: ULTRASOUND IMAGING | Facility: CLINIC | Age: 54
End: 2019-06-29
Attending: EMERGENCY MEDICINE
Payer: COMMERCIAL

## 2019-06-29 VITALS
BODY MASS INDEX: 23.33 KG/M2 | WEIGHT: 172 LBS | HEART RATE: 54 BPM | TEMPERATURE: 98.2 F | RESPIRATION RATE: 12 BRPM | DIASTOLIC BLOOD PRESSURE: 88 MMHG | OXYGEN SATURATION: 99 % | SYSTOLIC BLOOD PRESSURE: 125 MMHG

## 2019-06-29 DIAGNOSIS — S60.212A CONTUSION OF LEFT WRIST, INITIAL ENCOUNTER: ICD-10-CM

## 2019-06-29 PROCEDURE — 99284 EMERGENCY DEPT VISIT MOD MDM: CPT | Mod: 25

## 2019-06-29 PROCEDURE — 93931 UPPER EXTREMITY STUDY: CPT | Mod: LT

## 2019-06-29 ASSESSMENT — ENCOUNTER SYMPTOMS
NUMBNESS: 0
COLOR CHANGE: 1

## 2019-06-29 NOTE — ED PROVIDER NOTES
History     Chief Complaint:  Left wrist pain    HPI   Matty Lacy is a 54 year old male with a history of STEMI and CAD who presents with left wrist pain. The patient states he developed a bruise on his left wrest yesterday that started as a small dot-size. The patient reports today the bruise became larger but there is no swelling or redness or warmth or pain. The patient denies numbness or tingling and also denies any trauma to the region. The patient notes he went golfing on 06/27/2019. The patient further notes having a heart attack on 05/01/2019 and stent on 05/22/2019 at which point they performed angio at left radial artery.     Allergies:  No known drug allergies    Medications:    Aspirin 81 mg  Atorvastatin  Lisinopril  Toprol  Nitrostat  Brilinta    Past Medical History:    Myocardial infarction  CAD  STEMI    Past Surgical History:    CV Heart catheterization w/ possible intervention  CV Left heart cath  CV PCI Stent drug eluting    Family History:    CAD    Social History:  Smoking status: Former - Quit 05/05/2019  Alcohol use: Yes  The patient presents to the emergency department by himself.  Marital Status:  Single [1]    Review of Systems   Skin: Positive for color change.   Neurological: Negative for numbness.   All other systems reviewed and are negative.    Physical Exam   Patient Vitals for the past 24 hrs:   BP Temp Pulse Heart Rate Resp SpO2 Weight   06/29/19 1050 125/88 -- 54 -- 12 99 % --   06/29/19 0925 134/88 98.2  F (36.8  C) -- 77 12 98 % 78 kg (172 lb)     Physical Exam  VS: Reviewed per above  HENT: Mucous membranes moist  EYES: sclera anicteric  CV: Rate as noted, regular rhythm.   RESP: Effort normal.   NEURO: Alert, moving all extremities, median/radial/ulnar nerve function is intact in the distal left upper extremity with respect to motor and sensation, palpable left radial pulse  MSK: No deformity of the extremities  SKIN: Warm and dry, bruising over the left distal  radius where I would expect the radial artery to be located.  No appreciable soft tissue swelling or effusion or tenderness or warmth or redness.      Emergency Department Course   Imaging:  Radiographic findings were communicated with the patient who voiced understanding of the findings.    US Upper Extremity Arterial Duplex Left  IMPRESSION: Left radial artery appears normal with normal blood flow  throughout the forearm and at the puncture site. No hematoma  Identified.    As read by Radiology.    Emergency Department Course:  Past medical records, nursing notes, and vitals reviewed.  0948: I performed an exam of the patient and obtained history, as documented above.    The patient was sent for a left upper extremity arterial US while in the emergency department, findings above.    1101: I rechecked the patient. Findings and plan explained to the Patient. Patient discharged home with instructions regarding supportive care, medications, and reasons to return. The importance of close follow-up was reviewed.     Impression & Plan    Medical Decision Making:  Patient presents to the ER for evaluation of bruise over the left distal wrist near the location of a recent coronary angiogram puncture site approximately 1 month ago.  Initial vital signs unrevealing.  Exam only reveals contusion over right expect the left distal radial artery to be but there is no signs to suggest skin or soft tissue infection.  I also considered pseudoaneurysm and ultrasound did not reveal evidence of this or other acute arterial process.  Patient was reassured by these findings.  He understands to return for new or worsening symptoms.    Diagnosis:   ICD-10-CM   Contusion of left wrist, initial encounter S60.212A     Disposition:  Discharged to home.    Michael Estevez  6/29/2019    EMERGENCY DEPARTMENT  Scribe Disclosure:  Michael FARR, am serving as a scribe at 9:48 AM on 6/29/2019 to document services personally performed by  Andrzej Che MD based on my observations and the provider's statements to me.      Andrzej Che MD  06/29/19 [id number]

## 2019-06-29 NOTE — ED AVS SNAPSHOT
Emergency Department  [address] [place] [id number]  Phone:  [phone]  Fax:  [phone]                                    Matty Lacy   MRN: [id number]    Department:   Emergency Department   Date of Visit:  6/29/2019           After Visit Summary Signature Page    I have received my discharge instructions, and my questions have been answered. I have discussed any challenges I see with this plan with the nurse or doctor.    ..........................................................................................................................................  Patient/Patient Representative Signature      ..........................................................................................................................................  Patient Representative Print Name and Relationship to Patient    ..................................................               ................................................  Date                                   Time    ..........................................................................................................................................  Reviewed by Signature/Title    ...................................................              ..............................................  Date                                               Time          22EPIC Rev 08/18

## 2019-06-29 NOTE — TELEPHONE ENCOUNTER
"\"I had surgery and stents placed in May (see epic) and an angioplasty where they went through my wrist and told me to lay low for a couple of weeks and not use it much. It's been a month so yesterday I went golfing. Then I noticed a small pea size bruise on that same wrist. This am it's a lot bigger and looks a little swollen, no pain. I am taking   ticagrelor (BRILINTA) 90 MG tablet 180 tablet 3 5/16/2019  No   Sig - Route: Take 1 tablet (90 mg) by mouth every 12 hours - Oral     Denies other sx. Triaged and advised ER.  Guerline Silver RN [facility]        Reason for Disposition    [1] Caller has URGENT question AND [2] triager unable to answer question    Additional Information    Post-operative bruising    Negative: [1] SEVERE headache AND [2] after spinal (epidural) anesthesia    Negative: [1] Vomiting AND [2] persists > 4 hours    Negative: [1] Vomiting AND [2] abdomen looks much more swollen than usual    Negative: [1] Drinking very little AND [2] dehydration suspected (e.g., no urine > 12 hours, very dry mouth, very lightheaded)    Negative: Patient sounds very sick or weak to the triager    Negative: Sounds like a serious complication to the triager    Negative: Fever > 100.5 F (38.1 C)    Negative: [1] Widespread rash AND [2] bright red, sunburn-like    Negative: Chest pain    Negative: Difficulty breathing    Negative: Surgical incision symptoms and questions    Negative: [1] Discomfort (pain, burning or stinging) when passing urine AND [2] male    Negative: [1] Discomfort (pain, burning or stinging) when passing urine AND [2] female    Negative: Constipation    Negative: New or worsening leg (calf, thigh) pain    Negative: New or worsening leg swelling    Negative: Dizziness is severe, or persists > 24 hours after surgery    Negative: Pain, redness, swelling, or pus at IV Site    Negative: Symptoms arising from use of a urinary catheter (Bailey or Coude)    Negative: Cast problems or " questions    Negative: Medication question    Protocols used: POST-OP SYMPTOMS AND [id number], BRUISES-A-AH

## 2019-08-23 ENCOUNTER — TELEPHONE (OUTPATIENT)
Dept: CARDIOLOGY | Facility: CLINIC | Age: 54
End: 2019-08-23

## 2019-08-23 ENCOUNTER — OFFICE VISIT (OUTPATIENT)
Dept: CARDIOLOGY | Facility: CLINIC | Age: 54
End: 2019-08-23
Attending: NURSE PRACTITIONER
Payer: COMMERCIAL

## 2019-08-23 VITALS
DIASTOLIC BLOOD PRESSURE: 78 MMHG | BODY MASS INDEX: 22.89 KG/M2 | HEART RATE: 64 BPM | HEIGHT: 72 IN | WEIGHT: 169 LBS | SYSTOLIC BLOOD PRESSURE: 114 MMHG

## 2019-08-23 DIAGNOSIS — I25.10 CORONARY ARTERY DISEASE INVOLVING NATIVE CORONARY ARTERY OF NATIVE HEART WITHOUT ANGINA PECTORIS: ICD-10-CM

## 2019-08-23 LAB
ANION GAP SERPL CALCULATED.3IONS-SCNC: 12.6 MMOL/L (ref 6–17)
BUN SERPL-MCNC: 14 MG/DL (ref 7–30)
CALCIUM SERPL-MCNC: 10.3 MG/DL (ref 8.5–10.5)
CHLORIDE SERPL-SCNC: 103 MMOL/L (ref 98–107)
CHOLEST SERPL-MCNC: 156 MG/DL
CO2 SERPL-SCNC: 27 MMOL/L (ref 23–29)
CREAT SERPL-MCNC: 1.36 MG/DL (ref 0.7–1.3)
GFR SERPL CREATININE-BSD FRML MDRD: 55 ML/MIN/{1.73_M2}
GLUCOSE SERPL-MCNC: 104 MG/DL (ref 70–105)
HDLC SERPL-MCNC: 39 MG/DL
LDLC SERPL CALC-MCNC: 89 MG/DL
NONHDLC SERPL-MCNC: 117 MG/DL
POTASSIUM SERPL-SCNC: 4.6 MMOL/L (ref 3.5–5.1)
SODIUM SERPL-SCNC: 138 MMOL/L (ref 136–145)
TRIGL SERPL-MCNC: 142 MG/DL

## 2019-08-23 PROCEDURE — 36415 COLL VENOUS BLD VENIPUNCTURE: CPT | Performed by: NURSE PRACTITIONER

## 2019-08-23 PROCEDURE — 99214 OFFICE O/P EST MOD 30 MIN: CPT | Performed by: INTERNAL MEDICINE

## 2019-08-23 PROCEDURE — 80048 BASIC METABOLIC PNL TOTAL CA: CPT | Performed by: NURSE PRACTITIONER

## 2019-08-23 PROCEDURE — 80061 LIPID PANEL: CPT | Performed by: NURSE PRACTITIONER

## 2019-08-23 RX ORDER — METOPROLOL SUCCINATE 25 MG/1
50 TABLET, EXTENDED RELEASE ORAL DAILY
Qty: 90 TABLET | Refills: 3 | Status: SHIPPED | OUTPATIENT
Start: 2019-08-23 | End: 2019-08-23

## 2019-08-23 RX ORDER — ATORVASTATIN CALCIUM 40 MG/1
80 TABLET, FILM COATED ORAL DAILY
Qty: 90 TABLET | Refills: 3 | Status: SHIPPED | OUTPATIENT
Start: 2019-08-23 | End: 2019-08-23

## 2019-08-23 RX ORDER — ATORVASTATIN CALCIUM 80 MG/1
80 TABLET, FILM COATED ORAL DAILY
Qty: 30 TABLET | Refills: 11 | Status: SHIPPED | OUTPATIENT
Start: 2019-08-23 | End: 2019-09-27 | Stop reason: ALTCHOICE

## 2019-08-23 RX ORDER — METOPROLOL SUCCINATE 50 MG/1
50 TABLET, EXTENDED RELEASE ORAL DAILY
Qty: 30 TABLET | Refills: 11 | Status: SHIPPED | OUTPATIENT
Start: 2019-08-23 | End: 2020-03-26

## 2019-08-23 RX ORDER — METOPROLOL TARTRATE 25 MG/1
50 TABLET, FILM COATED ORAL DAILY
COMMUNITY
End: 2019-08-23

## 2019-08-23 ASSESSMENT — MIFFLIN-ST. JEOR: SCORE: 1649.58

## 2019-08-23 NOTE — TELEPHONE ENCOUNTER
Patient called stating that his pharmacy and insurance rejected the dosage that was prescribed to him at his OV today. Patient was prescribed 40 mg tablets of atorvastatin, take 2 tablets daily to equal 80 mg total and then also 25 mg tablets of metoprolol succinate, take 2 tablets to equal 50 mg daily. Corrected scripts escripted to patient's preferred pharmacy for 80 mg daily of atorvastatin and for 50 mg daily of metoprolol succinate.

## 2019-08-23 NOTE — PROGRESS NOTES
CARDIOLOGY CLINIC VISIT  DATE OF SERVICE:  AUGUST 23, 2019      HISTORY OF PRESENT ILLNESS:  Mr. Lacy is a pleasant 53 y/o gentleman with PMH significant for coronary artery disease s/p STEMI in 4/2019 who presents to clinic today for follow up.  He was previously seen by Esme Rae CNP and this is my first visit with Matty.  In brief review, presented with inferior STEMI on May 6 due to 100% occluded mid RCA, that was treated with a 3.0 x 20 mm, 3.0 x 12 mm drug-eluting stent.  The LV end-diastolic pressure was 10 mmHg without evidence of aortic stenosis.  His echocardiogram showed a low normal LV systolic function with a EF of 50 to 55%.  In follow-up he was doing well and I sent him back for a staged intervention.  On May 22 he went back for staged intervention to D1 with a 2.25 x 16 mm drug-eluting stent.  FFR of the mid LAD and ostial circumflex showed non-flow-limiting disease (0.84 and 0.93 respectively).  In follow up today, Matty reports feeling well.  He was smoking 1 1/2-2 packs per day until a couple of weeks prior to his MI, now only has occasional cigarettes when out socially.  He denies any exertional chest pain, chest discomfort, shortness of breath.  He is compliant with all of his medications.  He is working on a heart healthy diet.  He is without cardiovascular complaints.           PAST MEDICAL HISTORY:  1.  Coronary artery disease:  Hx ofPCI to RCA (2008), LCX (2010), S/P inferior STEMI 4/2019 s/p PCI with BRIDGETTE x1  2.  Hypertension  3.  Hyperlipidemia  4.  Prior tobacco abuse        MEDICATIONS:  Current Outpatient Medications   Medication     aspirin (ASA) 81 MG EC tablet     atorvastatin (LIPITOR) 40 MG tablet     lisinopril (PRINIVIL/ZESTRIL) 10 MG tablet     metoprolol succinate ER (TOPROL XL) 25 MG 24 hr tablet     nitroGLYcerin (NITROSTAT) 0.4 MG sublingual tablet     ticagrelor (BRILINTA) 90 MG tablet     No current facility-administered medications for this visit.         ALLERGIES:  No Known Allergies    SOCIAL HISTORY:  I have reviewed this patient's social history and updated it with pertinent information if needed. Matty Lacy  reports that he quit smoking about 3 months ago. His smoking use included cigarettes. He has a 3.00 pack-year smoking history. He has never used smokeless tobacco. He reports that he drinks alcohol. He reports that he does not use drugs.    FAMILY HISTORY:  I have reviewed this patient's family history and updated it with pertinent information if needed.   Family History   Problem Relation Age of Onset     Coronary Artery Disease Maternal Grandfather        REVIEW OF SYSTEMS:  A complete ROS was obtained and the pertinent positives are outlined in the history of present illness above. The remainder of systems is negative.      PHYSICAL EXAM:                     Vital Signs with Ranges     0 lbs 0 oz    Constitutional: awake, alert, no distress  Eyes: PERRL, sclera nonicteric  ENT: trachea midline  Respiratory: CTAB  Cardiovascular: RRR no m/r/g  GI: nondistended, nontender, bowel sounds present  Lymph/Hematologic: no lymphadenopathy  Skin: dry, no rash  Musculoskeletal: good muscle tone, strength 5/5 in upper and lower extremities  Neurologic: no focal deficits  Neuropsychiatric: appropriate affact    DATA:  Reviewed in EPIC            ASSESSMENT:  1.  Coronary artery disease: Inferior STEMI 4/2019 s/p PCI with BRIDGETTE x1 to RCA.  Low normal LV function, EF 50-55%.    2.  Hypertension  3.  Hyperlipidemia  4.  Prior tobacco abuse    RECOMMENDATIONS:  1.  Continue ASA indefinitely, brilinta for one year.  One month supply of brilinta samples given.  If he would like to switch to plavix, he will contact us BEFORE his current brilinta runs out.    2. Increase atorvastatin to 80 mg daily.  Recheck fasting lipids in 6 weeks.    3. Continue lisinopril.  Switch metoprolol XL to 50 mg once daily.  4.  Plan for follow up in 6 months with Esme Rae  CNP.  If doing well at that point, will plan on yearly follow up.      Cori Epps MD  Cardiology - [facility]  Pager:  [phone]  August 23, 2019

## 2019-08-23 NOTE — LETTER
8/23/2019    Physician No Ref-Primary  No address on file    RE: Matty Lacy       Dear Colleague,    I had the pleasure of seeing Matty Lacy in the [facility].    CARDIOLOGY CLINIC VISIT  DATE OF SERVICE:  AUGUST 23, 2019      HISTORY OF PRESENT ILLNESS:  Mr. Lacy is a pleasant 53 y/o gentleman with PMH significant for coronary artery disease s/p STEMI in 4/2019 who presents to clinic today for follow up.  He was previously seen by Esme Rae CNP and this is my first visit with Matty.  In brief review, presented with inferior STEMI on May 6 due to 100% occluded mid RCA, that was treated with a 3.0 x 20 mm, 3.0 x 12 mm drug-eluting stent.  The LV end-diastolic pressure was 10 mmHg without evidence of aortic stenosis.  His echocardiogram showed a low normal LV systolic function with a EF of 50 to 55%.  In follow-up he was doing well and I sent him back for a staged intervention.  On May 22 he went back for staged intervention to D1 with a 2.25 x 16 mm drug-eluting stent.  FFR of the mid LAD and ostial circumflex showed non-flow-limiting disease (0.84 and 0.93 respectively).  In follow up today, Matty reports feeling well.  He was smoking 1 1/2-2 packs per day until a couple of weeks prior to his MI, now only has occasional cigarettes when out socially.  He denies any exertional chest pain, chest discomfort, shortness of breath.  He is compliant with all of his medications.  He is working on a heart healthy diet.  He is without cardiovascular complaints.           PAST MEDICAL HISTORY:  1.  Coronary artery disease:  Hx ofPCI to RCA (2008), LCX (2010), S/P inferior STEMI 4/2019 s/p PCI with BRIDGETTE x1  2.  Hypertension  3.  Hyperlipidemia  4.  Prior tobacco abuse        MEDICATIONS:  Current Outpatient Medications   Medication     aspirin (ASA) 81 MG EC tablet     atorvastatin (LIPITOR) 40 MG tablet     lisinopril (PRINIVIL/ZESTRIL) 10 MG tablet     metoprolol  succinate ER (TOPROL XL) 25 MG 24 hr tablet     nitroGLYcerin (NITROSTAT) 0.4 MG sublingual tablet     ticagrelor (BRILINTA) 90 MG tablet     No current facility-administered medications for this visit.        ALLERGIES:  No Known Allergies    SOCIAL HISTORY:  I have reviewed this patient's social history and updated it with pertinent information if needed. Matty Lacy  reports that he quit smoking about 3 months ago. His smoking use included cigarettes. He has a 3.00 pack-year smoking history. He has never used smokeless tobacco. He reports that he drinks alcohol. He reports that he does not use drugs.    FAMILY HISTORY:  I have reviewed this patient's family history and updated it with pertinent information if needed.   Family History   Problem Relation Age of Onset     Coronary Artery Disease Maternal Grandfather        REVIEW OF SYSTEMS:  A complete ROS was obtained and the pertinent positives are outlined in the history of present illness above. The remainder of systems is negative.      PHYSICAL EXAM:                     Vital Signs with Ranges     0 lbs 0 oz    Constitutional: awake, alert, no distress  Eyes: PERRL, sclera nonicteric  ENT: trachea midline  Respiratory: CTAB  Cardiovascular: RRR no m/r/g  GI: nondistended, nontender, bowel sounds present  Lymph/Hematologic: no lymphadenopathy  Skin: dry, no rash  Musculoskeletal: good muscle tone, strength 5/5 in upper and lower extremities  Neurologic: no focal deficits  Neuropsychiatric: appropriate affact    DATA:  Reviewed in EPIC            ASSESSMENT:  1.  Coronary artery disease: Inferior STEMI 4/2019 s/p PCI with BRIDGETTE x1 to RCA.  Low normal LV function, EF 50-55%.    2.  Hypertension  3.  Hyperlipidemia  4.  Prior tobacco abuse    RECOMMENDATIONS:  1.  Continue ASA indefinitely, brilinta for one year.  One month supply of brilinta samples given.  If he would like to switch to plavix, he will contact us BEFORE his current brilinta runs out.    2.  Increase atorvastatin to 80 mg daily.  Recheck fasting lipids in 6 weeks.    3. Continue lisinopril.  Switch metoprolol XL to 50 mg once daily.  4.  Plan for follow up in 6 months with Esme Rae CNP.  If doing well at that point, will plan on yearly follow up.      Cori Epps MD  Cardiology - [facility]  Pager:  [phone]  August 23, 2019    Thank you for allowing me to participate in the care of your patient.      Sincerely,     Cori Epps MD     [facility]    cc:   CURT Mehta CNP  [address]  [place] [address]

## 2019-08-23 NOTE — PATIENT INSTRUCTIONS
-Switch metoprolol XL to 50 mg daily  -Increase atorvastatin to 80 mg daily  -Fasting lipids in 6 weeks (blood work)  -Follow up in 6 months with Esme Rae CNP  -Call us if needing to switch to plavix (BEFORE brilinta rx actually runs out)

## 2019-09-20 DIAGNOSIS — I25.10 CORONARY ARTERY DISEASE INVOLVING NATIVE CORONARY ARTERY OF NATIVE HEART WITHOUT ANGINA PECTORIS: ICD-10-CM

## 2019-09-20 LAB
CHOLEST SERPL-MCNC: 144 MG/DL
HDLC SERPL-MCNC: 40 MG/DL
LDLC SERPL CALC-MCNC: 90 MG/DL
NONHDLC SERPL-MCNC: 104 MG/DL
TRIGL SERPL-MCNC: 69 MG/DL

## 2019-09-20 PROCEDURE — 80061 LIPID PANEL: CPT | Performed by: INTERNAL MEDICINE

## 2019-09-20 PROCEDURE — 36415 COLL VENOUS BLD VENIPUNCTURE: CPT | Performed by: INTERNAL MEDICINE

## 2019-09-27 ENCOUNTER — TELEPHONE (OUTPATIENT)
Dept: CARDIOLOGY | Facility: CLINIC | Age: 54
End: 2019-09-27

## 2019-09-27 DIAGNOSIS — E78.5 HYPERLIPIDEMIA LDL GOAL <70: Primary | ICD-10-CM

## 2019-09-27 RX ORDER — ROSUVASTATIN CALCIUM 40 MG/1
40 TABLET, COATED ORAL DAILY
Qty: 90 TABLET | Refills: 1 | Status: SHIPPED | OUTPATIENT
Start: 2019-09-27 | End: 2020-02-28

## 2019-09-27 NOTE — TELEPHONE ENCOUNTER
RN called patient with results and Dr. Epps's recommendations. Patient verbalized understanding and is in agreement with plan. RN updated patient's med list to reflect changes. RN transferred patient to scheduling to arrange FLP in 6 weeks.     ----- Message from Cori Epps MD sent at 9/27/2019 11:26 AM CDT -----  Lipids reviewed; no significant improvement on higher dose of atorvastatin.  I would like him to stop atorvastatin, start crestor 40 mg daily.  Recheck lipids in 6 weeks.    Thanks,  Cori Epps MD        Component      Latest Ref Rng & Units 8/23/2019 9/20/2019   Cholesterol      <200 mg/dL 156 144   Triglycerides      <150 mg/dL 142 69   HDL Cholesterol      >39 mg/dL 39 (L) 40   LDL Cholesterol Calculated      <100 mg/dL 89 90   Non HDL Cholesterol      <130 mg/dL 117 104

## 2019-10-04 ENCOUNTER — HEALTH MAINTENANCE LETTER (OUTPATIENT)
Age: 54
End: 2019-10-04

## 2019-10-15 ENCOUNTER — MYC MEDICAL ADVICE (OUTPATIENT)
Dept: CARDIOLOGY | Facility: CLINIC | Age: 54
End: 2019-10-15

## 2019-10-15 DIAGNOSIS — I10 BENIGN ESSENTIAL HYPERTENSION: Primary | ICD-10-CM

## 2019-10-15 NOTE — TELEPHONE ENCOUNTER
Looker message received. RN will send to Dr. Epps for review and recommendation.         I'm having terrible coughing fits and my sleep is becoming more and more interrupted. I did a bit of research and found that the Lisinipril my be the culprit. Could someone please contact me in regards to switching this drug for another?     Thanks,     Matty         8/23/19 OV note Dr. Epps  ASSESSMENT:  1.  Coronary artery disease: Inferior STEMI 4/2019 s/p PCI with BRIDGETTE x1 to RCA.  Low normal LV function, EF 50-55%.    2.  Hypertension  3.  Hyperlipidemia  4.  Prior tobacco abuse     RECOMMENDATIONS:  1.  Continue ASA indefinitely, brilinta for one year.  One month supply of brilinta samples given.  If he would like to switch to plavix, he will contact us BEFORE his current brilinta runs out.    2. Increase atorvastatin to 80 mg daily.  Recheck fasting lipids in 6 weeks.    3. Continue lisinopril.  Switch metoprolol XL to 50 mg once daily.  4.  Plan for follow up in 6 months with Esme Rae CNP.  If doing well at that point, will plan on yearly follow up.       Cori Epps MD  Cardiology - [facility]  Pager:  [phone]  August 23, 2019

## 2019-10-23 RX ORDER — LOSARTAN POTASSIUM 25 MG/1
25 TABLET ORAL DAILY
Qty: 90 TABLET | Refills: 1 | Status: SHIPPED | OUTPATIENT
Start: 2019-10-23 | End: 2020-02-28

## 2019-10-23 NOTE — TELEPHONE ENCOUNTER
Dr. Epps's response. RN will reply to patient via [employer]. RN updated patient's med list to reflect changes.       Okay to stop lisinopril.  In its place, I recommend starting losartan 25 mg daily.  Recheck BMP in one week after starting new medication.  Hopefully, this will take care of the cough.     Thanks,   Cori Epps MD

## 2019-11-01 DIAGNOSIS — E78.5 HYPERLIPIDEMIA LDL GOAL <70: ICD-10-CM

## 2019-11-01 DIAGNOSIS — I10 BENIGN ESSENTIAL HYPERTENSION: ICD-10-CM

## 2019-11-01 LAB
ANION GAP SERPL CALCULATED.3IONS-SCNC: 14.9 MMOL/L (ref 6–17)
BUN SERPL-MCNC: 16 MG/DL (ref 7–30)
CALCIUM SERPL-MCNC: 10.2 MG/DL (ref 8.5–10.5)
CHLORIDE SERPL-SCNC: 104 MMOL/L (ref 98–107)
CHOLEST SERPL-MCNC: 143 MG/DL
CO2 SERPL-SCNC: 26 MMOL/L (ref 23–29)
CREAT SERPL-MCNC: 1.32 MG/DL (ref 0.7–1.3)
GFR SERPL CREATININE-BSD FRML MDRD: 57 ML/MIN/{1.73_M2}
GLUCOSE SERPL-MCNC: 107 MG/DL (ref 70–105)
HDLC SERPL-MCNC: 43 MG/DL
LDLC SERPL CALC-MCNC: 79 MG/DL
NONHDLC SERPL-MCNC: 100 MG/DL
POTASSIUM SERPL-SCNC: 4.9 MMOL/L (ref 3.5–5.1)
SODIUM SERPL-SCNC: 140 MMOL/L (ref 136–145)
TRIGL SERPL-MCNC: 104 MG/DL

## 2019-11-01 PROCEDURE — 80061 LIPID PANEL: CPT | Performed by: INTERNAL MEDICINE

## 2019-11-01 PROCEDURE — 36415 COLL VENOUS BLD VENIPUNCTURE: CPT | Performed by: INTERNAL MEDICINE

## 2019-11-01 PROCEDURE — 80048 BASIC METABOLIC PNL TOTAL CA: CPT | Performed by: INTERNAL MEDICINE

## 2020-02-28 ENCOUNTER — OFFICE VISIT (OUTPATIENT)
Dept: CARDIOLOGY | Facility: CLINIC | Age: 55
End: 2020-02-28
Attending: INTERNAL MEDICINE

## 2020-02-28 VITALS
WEIGHT: 173 LBS | HEART RATE: 61 BPM | BODY MASS INDEX: 23.43 KG/M2 | SYSTOLIC BLOOD PRESSURE: 113 MMHG | HEIGHT: 72 IN | DIASTOLIC BLOOD PRESSURE: 74 MMHG

## 2020-02-28 DIAGNOSIS — I25.10 CORONARY ARTERY DISEASE INVOLVING NATIVE CORONARY ARTERY OF NATIVE HEART WITHOUT ANGINA PECTORIS: ICD-10-CM

## 2020-02-28 DIAGNOSIS — E78.5 HYPERLIPIDEMIA LDL GOAL <70: ICD-10-CM

## 2020-02-28 PROCEDURE — 99214 OFFICE O/P EST MOD 30 MIN: CPT | Performed by: NURSE PRACTITIONER

## 2020-02-28 RX ORDER — CLOPIDOGREL BISULFATE 75 MG/1
75 TABLET ORAL DAILY
Qty: 90 TABLET | Refills: 0 | Status: SHIPPED | OUTPATIENT
Start: 2020-02-28 | End: 2020-07-28

## 2020-02-28 RX ORDER — LISINOPRIL 10 MG/1
10 TABLET ORAL DAILY
Qty: 90 TABLET | Refills: 3 | Status: SHIPPED | OUTPATIENT
Start: 2020-02-28 | End: 2021-02-12

## 2020-02-28 RX ORDER — ROSUVASTATIN CALCIUM 40 MG/1
40 TABLET, COATED ORAL DAILY
Qty: 90 TABLET | Refills: 3 | Status: SHIPPED | OUTPATIENT
Start: 2020-02-28 | End: 2021-02-12

## 2020-02-28 RX ORDER — LISINOPRIL 10 MG/1
10 TABLET ORAL DAILY
Refills: 3 | COMMUNITY
Start: 2019-10-08 | End: 2020-02-28

## 2020-02-28 ASSESSMENT — MIFFLIN-ST. JEOR: SCORE: 1662.72

## 2020-02-28 NOTE — LETTER
2/28/2020    Physician No Ref-Primary  No address on file    RE: Matty Lacy       Dear Colleague,    I had the pleasure of seeing Matty Lacy in the [facility].    HPI and Plan:   I had the pleasure of seeing Matty Lacy today in cardiology clinic follow up after medication changes. He is a pleasant 55 year old patient of Dr. Epps.    He had a inferior STEMI  May 6, 2019 after discontinuing his medications. Prior to that he did have a history of coronary artery disease with 2 previous MIs with previous RCA stenting, tobacco abuse. He was found to have a 100% occluded mid RCA, that was treated with a 3.0 x 20 mm, 3.0 x 12 mm drug-eluting stent.  The LV end-diastolic pressure was 10 mmHg without evidence of aortic stenosis.  His echocardiogram showed a low normal LV systolic function with a EF of 50 to 55%.  May 22 he went back for staged intervention to D1 with a 2.25 x 16 mm drug-eluting stent.  FFR of the mid LAD and ostial circumflex showed non-flow-limiting disease (0.84 and 0.93 respectively).  He was placed on ticagrelor and aspirin and restarted beta-blockade and ACE inhibitor therapy. He started statin therapy, his LDL was 141 and his triglycerides 363.     He saw Dr. Moscoso in August, she increased his statin and switched him to losartan to see if this would help with his cough.  He found that the losartan did not help with his cough and he thought it made him a little bit bradycardic.  We discussed that the bradycardia was more likely due to the increased dose of beta-blocker.  He continues to do well.  He runs without any angina.  He is cut down his smoking to socially just once every couple of weeks.  He is also cut down from 10 cans of soda a day to 1 can of soda a day.  He has a fit bit on and says that sometime in December and early January he started to notice that his heart rate was slower.  Prior to that it had been in the 80s and 70s  fasting, for unknown reasons it decreased into the 50s while resting.  Since he switched back to lisinopril, he thinks the heart rate has also started to go back up.  He does sometimes notice a fluttering sensation in his chest.  The symptoms are infrequent, they could go for couple weeks without occurring, and generally they are getting better.    Labs Reviewed: LDL 79, creatinine 1.32    Physical Exam  Please see Below     Assessment and Plan  1.  Coronary artery disease.  He had a inferior STEMI in May 2019 with a staged intervention later to D1.  He is done well without any ischemic symptoms.  He continues on Brilinta and aspirin.  He should continue on this until at least May 2020 at which the Brilinta can fall off.  However he is having problems with affording Brilinta, he has about 1 month left before he runs out.  I did give him a prescription for Plavix.  I gave him written and verbal instructions that he will take his last dose of Brilinta in the evening and the following morning take a loading dose of Plavix, 600 mg which would be 8 tablets.  He will then take 75 mg daily.  He continues on aspirin and statin therapy.  2.  Hypertension.  Controlled on Toprol and lisinopril 10.  3.  Bradycardia.  He did notice resting heart rates in the 50s in early January, prior to this they have been generally in the 70s and 80s.  He says he has this sometimes a fluttering sensation.  At this point he thinks his symptoms have improved and they are not really bothering him.  He is not sure that a monitor would capture them either.  He is can continue to monitor this and will discuss it when I see him in May for follow-up, at that time if he is having symptoms we could consider doing some event monitoring.    Thank you for allowing me to care for Matty Lacy today.    CURT Whiteside, CNP  Cardiology    Voice recognition software was used for this note, I have reviewed this note, but errors may have been  missed.    Orders Placed This Encounter   Procedures     Basic metabolic panel     Lipid Profile     Follow-Up with Cardiac Advanced Practice Provider     Orders Placed This Encounter   Medications     DISCONTD: lisinopril (ZESTRIL) 10 MG tablet     Sig: Take 10 mg by mouth daily      Refill:  3     lisinopril (ZESTRIL) 10 MG tablet     Sig: Take 1 tablet (10 mg) by mouth daily     Dispense:  90 tablet     Refill:  3     rosuvastatin (CRESTOR) 40 MG tablet     Sig: Take 1 tablet (40 mg) by mouth daily     Dispense:  90 tablet     Refill:  3     clopidogrel (PLAVIX) 75 MG tablet     Sig: Take 1 tablet (75 mg) by mouth daily The morning after you take your last Brillinta, take 8 Plavix tablets (600 mg). Then take 75 mg daily thereafter.     Dispense:  90 tablet     Refill:  0     Medications Discontinued During This Encounter   Medication Reason     losartan (COZAAR) 25 MG tablet Stopped by Patient     lisinopril (ZESTRIL) 10 MG tablet Reorder     rosuvastatin (CRESTOR) 40 MG tablet Reorder         CURRENT MEDICATIONS:  Current Outpatient Medications   Medication Sig Dispense Refill     aspirin (ASA) 81 MG EC tablet Take 1 tablet (81 mg) by mouth daily 30 tablet 0     clopidogrel (PLAVIX) 75 MG tablet Take 1 tablet (75 mg) by mouth daily The morning after you take your last Brillinta, take 8 Plavix tablets (600 mg). Then take 75 mg daily thereafter. 90 tablet 0     lisinopril (ZESTRIL) 10 MG tablet Take 1 tablet (10 mg) by mouth daily 90 tablet 3     metoprolol succinate ER (TOPROL-XL) 50 MG 24 hr tablet Take 1 tablet (50 mg) by mouth daily 30 tablet 11     rosuvastatin (CRESTOR) 40 MG tablet Take 1 tablet (40 mg) by mouth daily 90 tablet 3     ticagrelor (BRILINTA) 90 MG tablet Take 1 tablet (90 mg) by mouth every 12 hours 180 tablet 3     nitroGLYcerin (NITROSTAT) 0.4 MG sublingual tablet For chest pain place 1 tablet under the tongue every 5 minutes for 3 doses. If symptoms persist 5 minutes after 1st dose call  911. 10 tablet 0       ALLERGIES   No Known Allergies    PAST MEDICAL HISTORY:  Past Medical History:   Diagnosis Date     Myocardial infarction (H)        PAST SURGICAL HISTORY:  Past Surgical History:   Procedure Laterality Date     CV HEART CATHETERIZATION WITH POSSIBLE INTERVENTION N/A 2019    Procedure: Heart Catheterization with possible Intervention;  Surgeon: Ashanti Epstein MD;  Location:  HEART CARDIAC CATH LAB     CV HEART CATHETERIZATION WITH POSSIBLE INTERVENTION N/A 2019    Procedure: Heart Catheterization with Possible Intervention;  Surgeon: Ashanti Epstein MD;  Location:  HEART CARDIAC CATH LAB     CV LEFT HEART CATH N/A 2019    Procedure: Left Heart Cath;  Surgeon: Ashanti Epstein MD;  Location:  HEART CARDIAC CATH LAB     CV PCI STENT DRUG ELUTING N/A 2019    Procedure: PCI Stent Drug Eluting;  Surgeon: Ashanti Epstein MD;  Location:  HEART CARDIAC CATH LAB     CV PCI STENT DRUG ELUTING N/A 2019    Procedure: PCI Stent Drug Eluting;  Surgeon: Ashanti Epstein MD;  Location:  HEART CARDIAC CATH LAB       FAMILY HISTORY:  Family History   Problem Relation Age of Onset     Coronary Artery Disease Maternal Grandfather        SOCIAL HISTORY:  Social History     Socioeconomic History     Marital status: Single     Spouse name: None     Number of children: None     Years of education: None     Highest education level: None   Occupational History     None   Social Needs     Financial resource strain: None     Food insecurity:     Worry: None     Inability: None     Transportation needs:     Medical: None     Non-medical: None   Tobacco Use     Smoking status: Former Smoker     Packs/day: 0.50     Years: 6.00     Pack years: 3.00     Types: Cigarettes     Last attempt to quit: 2019     Years since quittin.8     Smokeless tobacco: Never Used   Substance and Sexual Activity     Alcohol use: Yes     Comment: monthly     Drug use: Never      Sexual activity: None   Lifestyle     Physical activity:     Days per week: None     Minutes per session: None     Stress: None   Relationships     Social connections:     Talks on phone: None     Gets together: None     Attends Church service: None     Active member of club or organization: None     Attends meetings of clubs or organizations: None     Relationship status: None     Intimate partner violence:     Fear of current or ex partner: None     Emotionally abused: None     Physically abused: None     Forced sexual activity: None   Other Topics Concern     Parent/sibling w/ CABG, MI or angioplasty before 65F 55M? Not Asked   Social History Narrative     None       Review of Systems:  Skin:  Negative       Eyes:  Positive for glasses night driving   ENT:  Negative      Respiratory:  Positive for   occasionally feels like he can't get a full breath   Cardiovascular:    Positive for feels heart beating hard sometimes - but not fast  Gastroenterology: Negative      Genitourinary:  Negative      Musculoskeletal:  Negative      Neurologic:  Negative      Psychiatric:  Negative      Heme/Lymph/Imm:  Negative      Endocrine:  Negative        Physical Exam:  Vitals: /74   Pulse 61   Ht 1.829 m (6')   Wt 78.5 kg (173 lb)   BMI 23.46 kg/m       Constitutional:  cooperative appears anxious      Skin:  warm and dry to the touch          Head:  normocephalic        Eyes:           Lymph:      ENT:           Neck:  JVP normal        Respiratory:  clear to auscultation         Cardiac: regular rhythm;normal S1 and S2                pulses full and equal                                        GI:      thin    Extremities and Muscular Skeletal:  no edema              Neurological:  no gross motor deficits        Psych:  Alert and Oriented x 3    Encounter Diagnoses   Name Primary?     Coronary artery disease involving native coronary artery of native heart without angina pectoris      Hyperlipidemia LDL goal  <70        Recent Lab Results:  LIPID RESULTS:  Lab Results   Component Value Date    CHOL 143 11/01/2019    HDL 43 11/01/2019    LDL 79 11/01/2019    TRIG 104 11/01/2019       LIVER ENZYME RESULTS:  No results found for: AST, ALT    CBC RESULTS:  Lab Results   Component Value Date    WBC 7.7 05/22/2019    RBC 4.70 05/22/2019    HGB 15.6 05/22/2019    HCT 43.8 05/22/2019    MCV 93 05/22/2019    MCH 33.2 (H) 05/22/2019    MCHC 35.6 05/22/2019    RDW 11.9 05/22/2019     05/22/2019       BMP RESULTS:  Lab Results   Component Value Date     11/01/2019    POTASSIUM 4.9 11/01/2019    CHLORIDE 104 11/01/2019    CO2 26 11/01/2019    ANIONGAP 14.9 11/01/2019     (H) 11/01/2019    BUN 16 11/01/2019    CR 1.32 (H) 11/01/2019    GFRESTIMATED 57 (L) 11/01/2019    GFRESTBLACK 69 11/01/2019    CHRISTINE 10.2 11/01/2019        A1C RESULTS:  No results found for: A1C    INR RESULTS:  Lab Results   Component Value Date    INR 1.15 (H) 05/22/2019    INR 0.93 05/06/2019           CC  Cori Epps MD  [facility] [place] [address]  [place] [id number]                    Thank you for allowing me to participate in the care of your patient.      Sincerely,     CURT Alvarez [facility]    cc:   Cori Epps MD  [facility] [place] [address]  [place] [id number]

## 2020-02-28 NOTE — PATIENT INSTRUCTIONS
When you run our to Brillinta, take the last Brillinta dose in the evening. The next morning take 600 mg (8 tablets) of Plavix. Then take one 75 mg Plavix tablet daily at least until May 22, 2020.     See me in May    Esme  [phone]

## 2020-02-28 NOTE — PROGRESS NOTES
HPI and Plan:   I had the pleasure of seeing Matty Lacy today in cardiology clinic follow up after medication changes. He is a pleasant 55 year old patient of Dr. Epps.    He had a inferior STEMI  May 6, 2019 after discontinuing his medications. Prior to that he did have a history of coronary artery disease with 2 previous MIs with previous RCA stenting, tobacco abuse. He was found to have a 100% occluded mid RCA, that was treated with a 3.0 x 20 mm, 3.0 x 12 mm drug-eluting stent.  The LV end-diastolic pressure was 10 mmHg without evidence of aortic stenosis.  His echocardiogram showed a low normal LV systolic function with a EF of 50 to 55%.  May 22 he went back for staged intervention to D1 with a 2.25 x 16 mm drug-eluting stent.  FFR of the mid LAD and ostial circumflex showed non-flow-limiting disease (0.84 and 0.93 respectively).  He was placed on ticagrelor and aspirin and restarted beta-blockade and ACE inhibitor therapy. He started statin therapy, his LDL was 141 and his triglycerides 363.     He saw Dr. Moscoso in August, she increased his statin and switched him to losartan to see if this would help with his cough.  He found that the losartan did not help with his cough and he thought it made him a little bit bradycardic.  We discussed that the bradycardia was more likely due to the increased dose of beta-blocker.  He continues to do well.  He runs without any angina.  He is cut down his smoking to socially just once every couple of weeks.  He is also cut down from 10 cans of soda a day to 1 can of soda a day.  He has a fit bit on and says that sometime in December and early January he started to notice that his heart rate was slower.  Prior to that it had been in the 80s and 70s fasting, for unknown reasons it decreased into the 50s while resting.  Since he switched back to lisinopril, he thinks the heart rate has also started to go back up.  He does sometimes notice a fluttering sensation in  his chest.  The symptoms are infrequent, they could go for couple weeks without occurring, and generally they are getting better.    Labs Reviewed: LDL 79, creatinine 1.32    Physical Exam  Please see Below     Assessment and Plan  1.  Coronary artery disease.  He had a inferior STEMI in May 2019 with a staged intervention later to D1.  He is done well without any ischemic symptoms.  He continues on Brilinta and aspirin.  He should continue on this until at least May 2020 at which the Brilinta can fall off.  However he is having problems with affording Brilinta, he has about 1 month left before he runs out.  I did give him a prescription for Plavix.  I gave him written and verbal instructions that he will take his last dose of Brilinta in the evening and the following morning take a loading dose of Plavix, 600 mg which would be 8 tablets.  He will then take 75 mg daily.  He continues on aspirin and statin therapy.  2.  Hypertension.  Controlled on Toprol and lisinopril 10.  3.  Bradycardia.  He did notice resting heart rates in the 50s in early January, prior to this they have been generally in the 70s and 80s.  He says he has this sometimes a fluttering sensation.  At this point he thinks his symptoms have improved and they are not really bothering him.  He is not sure that a monitor would capture them either.  He is can continue to monitor this and will discuss it when I see him in May for follow-up, at that time if he is having symptoms we could consider doing some event monitoring.    Thank you for allowing me to care for Matty Lacy today.    CURT Whiteside, CNP  Cardiology    Voice recognition software was used for this note, I have reviewed this note, but errors may have been missed.    Orders Placed This Encounter   Procedures     Basic metabolic panel     Lipid Profile     Follow-Up with Cardiac Advanced Practice Provider     Orders Placed This Encounter   Medications     DISCONTD: lisinopril  (ZESTRIL) 10 MG tablet     Sig: Take 10 mg by mouth daily      Refill:  3     lisinopril (ZESTRIL) 10 MG tablet     Sig: Take 1 tablet (10 mg) by mouth daily     Dispense:  90 tablet     Refill:  3     rosuvastatin (CRESTOR) 40 MG tablet     Sig: Take 1 tablet (40 mg) by mouth daily     Dispense:  90 tablet     Refill:  3     clopidogrel (PLAVIX) 75 MG tablet     Sig: Take 1 tablet (75 mg) by mouth daily The morning after you take your last Brillinta, take 8 Plavix tablets (600 mg). Then take 75 mg daily thereafter.     Dispense:  90 tablet     Refill:  0     Medications Discontinued During This Encounter   Medication Reason     losartan (COZAAR) 25 MG tablet Stopped by Patient     lisinopril (ZESTRIL) 10 MG tablet Reorder     rosuvastatin (CRESTOR) 40 MG tablet Reorder         CURRENT MEDICATIONS:  Current Outpatient Medications   Medication Sig Dispense Refill     aspirin (ASA) 81 MG EC tablet Take 1 tablet (81 mg) by mouth daily 30 tablet 0     clopidogrel (PLAVIX) 75 MG tablet Take 1 tablet (75 mg) by mouth daily The morning after you take your last Brillinta, take 8 Plavix tablets (600 mg). Then take 75 mg daily thereafter. 90 tablet 0     lisinopril (ZESTRIL) 10 MG tablet Take 1 tablet (10 mg) by mouth daily 90 tablet 3     metoprolol succinate ER (TOPROL-XL) 50 MG 24 hr tablet Take 1 tablet (50 mg) by mouth daily 30 tablet 11     rosuvastatin (CRESTOR) 40 MG tablet Take 1 tablet (40 mg) by mouth daily 90 tablet 3     ticagrelor (BRILINTA) 90 MG tablet Take 1 tablet (90 mg) by mouth every 12 hours 180 tablet 3     nitroGLYcerin (NITROSTAT) 0.4 MG sublingual tablet For chest pain place 1 tablet under the tongue every 5 minutes for 3 doses. If symptoms persist 5 minutes after 1st dose call 911. 10 tablet 0       ALLERGIES   No Known Allergies    PAST MEDICAL HISTORY:  Past Medical History:   Diagnosis Date     Myocardial infarction (H)        PAST SURGICAL HISTORY:  Past Surgical History:   Procedure Laterality  Date     CV HEART CATHETERIZATION WITH POSSIBLE INTERVENTION N/A 2019    Procedure: Heart Catheterization with possible Intervention;  Surgeon: Ashanti Epstein MD;  Location:  HEART CARDIAC CATH LAB     CV HEART CATHETERIZATION WITH POSSIBLE INTERVENTION N/A 2019    Procedure: Heart Catheterization with Possible Intervention;  Surgeon: Ashanti Epstein MD;  Location:  HEART CARDIAC CATH LAB     CV LEFT HEART CATH N/A 2019    Procedure: Left Heart Cath;  Surgeon: Ashanti Epstein MD;  Location:  HEART CARDIAC CATH LAB     CV PCI STENT DRUG ELUTING N/A 2019    Procedure: PCI Stent Drug Eluting;  Surgeon: Ashanti Epstein MD;  Location:  HEART CARDIAC CATH LAB     CV PCI STENT DRUG ELUTING N/A 2019    Procedure: PCI Stent Drug Eluting;  Surgeon: Ashanti Epstein MD;  Location:  HEART CARDIAC CATH LAB       FAMILY HISTORY:  Family History   Problem Relation Age of Onset     Coronary Artery Disease Maternal Grandfather        SOCIAL HISTORY:  Social History     Socioeconomic History     Marital status: Single     Spouse name: None     Number of children: None     Years of education: None     Highest education level: None   Occupational History     None   Social Needs     Financial resource strain: None     Food insecurity:     Worry: None     Inability: None     Transportation needs:     Medical: None     Non-medical: None   Tobacco Use     Smoking status: Former Smoker     Packs/day: 0.50     Years: 6.00     Pack years: 3.00     Types: Cigarettes     Last attempt to quit: 2019     Years since quittin.8     Smokeless tobacco: Never Used   Substance and Sexual Activity     Alcohol use: Yes     Comment: monthly     Drug use: Never     Sexual activity: None   Lifestyle     Physical activity:     Days per week: None     Minutes per session: None     Stress: None   Relationships     Social connections:     Talks on phone: None     Gets together: None      Attends Anabaptist service: None     Active member of club or organization: None     Attends meetings of clubs or organizations: None     Relationship status: None     Intimate partner violence:     Fear of current or ex partner: None     Emotionally abused: None     Physically abused: None     Forced sexual activity: None   Other Topics Concern     Parent/sibling w/ CABG, MI or angioplasty before 65F 55M? Not Asked   Social History Narrative     None       Review of Systems:  Skin:  Negative       Eyes:  Positive for glasses night driving   ENT:  Negative      Respiratory:  Positive for   occasionally feels like he can't get a full breath   Cardiovascular:    Positive for feels heart beating hard sometimes - but not fast  Gastroenterology: Negative      Genitourinary:  Negative      Musculoskeletal:  Negative      Neurologic:  Negative      Psychiatric:  Negative      Heme/Lymph/Imm:  Negative      Endocrine:  Negative        Physical Exam:  Vitals: /74   Pulse 61   Ht 1.829 m (6')   Wt 78.5 kg (173 lb)   BMI 23.46 kg/m      Constitutional:  cooperative appears anxious      Skin:  warm and dry to the touch          Head:  normocephalic        Eyes:           Lymph:      ENT:           Neck:  JVP normal        Respiratory:  clear to auscultation         Cardiac: regular rhythm;normal S1 and S2                pulses full and equal                                        GI:      thin    Extremities and Muscular Skeletal:  no edema              Neurological:  no gross motor deficits        Psych:  Alert and Oriented x 3    Encounter Diagnoses   Name Primary?     Coronary artery disease involving native coronary artery of native heart without angina pectoris      Hyperlipidemia LDL goal <70        Recent Lab Results:  LIPID RESULTS:  Lab Results   Component Value Date    CHOL 143 11/01/2019    HDL 43 11/01/2019    LDL 79 11/01/2019    TRIG 104 11/01/2019       LIVER ENZYME RESULTS:  No results found for: AST,  ALT    CBC RESULTS:  Lab Results   Component Value Date    WBC 7.7 05/22/2019    RBC 4.70 05/22/2019    HGB 15.6 05/22/2019    HCT 43.8 05/22/2019    MCV 93 05/22/2019    MCH 33.2 (H) 05/22/2019    MCHC 35.6 05/22/2019    RDW 11.9 05/22/2019     05/22/2019       BMP RESULTS:  Lab Results   Component Value Date     11/01/2019    POTASSIUM 4.9 11/01/2019    CHLORIDE 104 11/01/2019    CO2 26 11/01/2019    ANIONGAP 14.9 11/01/2019     (H) 11/01/2019    BUN 16 11/01/2019    CR 1.32 (H) 11/01/2019    GFRESTIMATED 57 (L) 11/01/2019    GFRESTBLACK 69 11/01/2019    CHRISTINE 10.2 11/01/2019        A1C RESULTS:  No results found for: A1C    INR RESULTS:  Lab Results   Component Value Date    INR 1.15 (H) 05/22/2019    INR 0.93 05/06/2019           CC  Cori Epps MD  [facility]ART AT [place]  [address]  [place] [address]

## 2020-03-26 DIAGNOSIS — I25.10 CORONARY ARTERY DISEASE INVOLVING NATIVE CORONARY ARTERY OF NATIVE HEART WITHOUT ANGINA PECTORIS: ICD-10-CM

## 2020-03-26 RX ORDER — METOPROLOL SUCCINATE 50 MG/1
50 TABLET, EXTENDED RELEASE ORAL DAILY
Qty: 90 TABLET | Refills: 0 | Status: SHIPPED | OUTPATIENT
Start: 2020-03-26 | End: 2020-07-28

## 2020-05-28 ENCOUNTER — TELEPHONE (OUTPATIENT)
Dept: CARDIOLOGY | Facility: CLINIC | Age: 55
End: 2020-05-28

## 2020-05-28 NOTE — TELEPHONE ENCOUNTER
Wellness Screening Tool    Symptom Screening:    Do you have one of the following NEW symptoms:      Fever (subjective or >100.0)? No    New cough?  No    Shortness of breath?  No    Chills? No    New loss of taste or smell? No    Generalized body aches? No    New persistent headache?  No    New sore throat?  No    Nausea, vomiting or diarrhea?  No    Within the past 3 weeks, have you been exposed to someone with a known positive illness below?      COVID - 19 (known or suspected)no    Chicken pox?no    Measles?  no    Pertussis? r No          Patient notified of visitor restriction: Yes  Patient informed to wear a mask: YES  Patient's appointment status: Patient will be seen in clinic as scheduled on

## 2020-05-29 DIAGNOSIS — E78.5 HYPERLIPIDEMIA LDL GOAL <70: ICD-10-CM

## 2020-05-29 DIAGNOSIS — I25.10 CORONARY ARTERY DISEASE INVOLVING NATIVE CORONARY ARTERY OF NATIVE HEART WITHOUT ANGINA PECTORIS: ICD-10-CM

## 2020-05-29 LAB
ANION GAP SERPL CALCULATED.3IONS-SCNC: 4 MMOL/L (ref 3–14)
BUN SERPL-MCNC: 15 MG/DL (ref 7–30)
CALCIUM SERPL-MCNC: 9.7 MG/DL (ref 8.5–10.1)
CHLORIDE SERPL-SCNC: 106 MMOL/L (ref 94–109)
CHOLEST SERPL-MCNC: 155 MG/DL
CO2 SERPL-SCNC: 27 MMOL/L (ref 20–32)
CREAT SERPL-MCNC: 1.18 MG/DL (ref 0.66–1.25)
GFR SERPL CREATININE-BSD FRML MDRD: 69 ML/MIN/{1.73_M2}
GLUCOSE SERPL-MCNC: 96 MG/DL (ref 70–99)
HDLC SERPL-MCNC: 43 MG/DL
LDLC SERPL CALC-MCNC: 90 MG/DL
NONHDLC SERPL-MCNC: 112 MG/DL
POTASSIUM SERPL-SCNC: 4.6 MMOL/L (ref 3.4–5.3)
SODIUM SERPL-SCNC: 137 MMOL/L (ref 133–144)
TRIGL SERPL-MCNC: 112 MG/DL

## 2020-05-29 PROCEDURE — 80048 BASIC METABOLIC PNL TOTAL CA: CPT | Performed by: NURSE PRACTITIONER

## 2020-05-29 PROCEDURE — 36415 COLL VENOUS BLD VENIPUNCTURE: CPT | Performed by: NURSE PRACTITIONER

## 2020-05-29 PROCEDURE — 80061 LIPID PANEL: CPT | Performed by: NURSE PRACTITIONER

## 2020-07-28 DIAGNOSIS — I25.10 CORONARY ARTERY DISEASE INVOLVING NATIVE CORONARY ARTERY OF NATIVE HEART WITHOUT ANGINA PECTORIS: ICD-10-CM

## 2020-07-28 RX ORDER — CLOPIDOGREL BISULFATE 75 MG/1
75 TABLET ORAL DAILY
Qty: 90 TABLET | Refills: 0 | Status: SHIPPED | OUTPATIENT
Start: 2020-07-28 | End: 2020-09-08

## 2020-07-28 RX ORDER — METOPROLOL SUCCINATE 50 MG/1
50 TABLET, EXTENDED RELEASE ORAL DAILY
Qty: 90 TABLET | Refills: 0 | Status: SHIPPED | OUTPATIENT
Start: 2020-07-28 | End: 2021-02-12

## 2020-09-05 DIAGNOSIS — I25.118 CORONARY ARTERY DISEASE OF NATIVE HEART WITH STABLE ANGINA PECTORIS, UNSPECIFIED VESSEL OR LESION TYPE (H): Primary | ICD-10-CM

## 2020-09-05 RX ORDER — ROSUVASTATIN CALCIUM 40 MG/1
40 TABLET, COATED ORAL DAILY
Qty: 90 TABLET | Refills: 3 | Status: SHIPPED | OUTPATIENT
Start: 2020-09-05 | End: 2021-03-17

## 2020-09-05 RX ORDER — METOPROLOL SUCCINATE 50 MG/1
50 TABLET, EXTENDED RELEASE ORAL DAILY
Qty: 90 TABLET | Refills: 3 | Status: SHIPPED | OUTPATIENT
Start: 2020-09-05 | End: 2020-09-08

## 2020-09-05 RX ORDER — CLOPIDOGREL BISULFATE 75 MG/1
75 TABLET ORAL DAILY
Qty: 90 TABLET | Refills: 3 | Status: SHIPPED | OUTPATIENT
Start: 2020-09-05 | End: 2021-03-17

## 2020-09-05 RX ORDER — LISINOPRIL 10 MG/1
10 TABLET ORAL DAILY
Qty: 90 TABLET | Refills: 3 | Status: SHIPPED | OUTPATIENT
Start: 2020-09-05 | End: 2021-03-17

## 2020-09-08 DIAGNOSIS — I25.118 CORONARY ARTERY DISEASE OF NATIVE HEART WITH STABLE ANGINA PECTORIS, UNSPECIFIED VESSEL OR LESION TYPE (H): ICD-10-CM

## 2020-09-08 DIAGNOSIS — I25.10 CORONARY ARTERY DISEASE INVOLVING NATIVE CORONARY ARTERY OF NATIVE HEART WITHOUT ANGINA PECTORIS: ICD-10-CM

## 2020-09-08 RX ORDER — CLOPIDOGREL BISULFATE 75 MG/1
75 TABLET ORAL DAILY
Qty: 90 TABLET | Refills: 0 | Status: SHIPPED | OUTPATIENT
Start: 2020-09-08 | End: 2021-02-12

## 2020-09-08 RX ORDER — METOPROLOL SUCCINATE 50 MG/1
50 TABLET, EXTENDED RELEASE ORAL DAILY
Qty: 90 TABLET | Refills: 0 | Status: SHIPPED | OUTPATIENT
Start: 2020-09-08 | End: 2021-02-03

## 2020-11-14 ENCOUNTER — HEALTH MAINTENANCE LETTER (OUTPATIENT)
Age: 55
End: 2020-11-14

## 2021-02-01 PROBLEM — E78.2 MIXED HYPERLIPIDEMIA: Status: ACTIVE | Noted: 2021-02-01

## 2021-02-01 PROBLEM — I10 BENIGN ESSENTIAL HYPERTENSION: Status: ACTIVE | Noted: 2021-02-01

## 2021-02-03 DIAGNOSIS — I25.118 CORONARY ARTERY DISEASE OF NATIVE HEART WITH STABLE ANGINA PECTORIS, UNSPECIFIED VESSEL OR LESION TYPE (H): ICD-10-CM

## 2021-02-03 RX ORDER — METOPROLOL SUCCINATE 50 MG/1
50 TABLET, EXTENDED RELEASE ORAL DAILY
Qty: 30 TABLET | Refills: 0 | Status: SHIPPED | OUTPATIENT
Start: 2021-02-03 | End: 2021-03-17

## 2021-02-12 ENCOUNTER — TELEPHONE (OUTPATIENT)
Dept: CARDIOLOGY | Facility: CLINIC | Age: 56
End: 2021-02-12

## 2021-02-12 ENCOUNTER — OFFICE VISIT (OUTPATIENT)
Dept: CARDIOLOGY | Facility: CLINIC | Age: 56
End: 2021-02-12
Attending: NURSE PRACTITIONER

## 2021-02-12 VITALS
SYSTOLIC BLOOD PRESSURE: 130 MMHG | WEIGHT: 168 LBS | BODY MASS INDEX: 22.78 KG/M2 | HEART RATE: 68 BPM | DIASTOLIC BLOOD PRESSURE: 76 MMHG | OXYGEN SATURATION: 98 %

## 2021-02-12 DIAGNOSIS — E78.5 HYPERLIPIDEMIA LDL GOAL <70: ICD-10-CM

## 2021-02-12 DIAGNOSIS — I10 BENIGN ESSENTIAL HYPERTENSION: ICD-10-CM

## 2021-02-12 DIAGNOSIS — I25.10 CORONARY ARTERY DISEASE INVOLVING NATIVE CORONARY ARTERY OF NATIVE HEART WITHOUT ANGINA PECTORIS: ICD-10-CM

## 2021-02-12 DIAGNOSIS — Z72.0 TOBACCO ABUSE DISORDER: ICD-10-CM

## 2021-02-12 DIAGNOSIS — I25.10 CORONARY ARTERY DISEASE INVOLVING NATIVE CORONARY ARTERY OF NATIVE HEART WITHOUT ANGINA PECTORIS: Primary | ICD-10-CM

## 2021-02-12 LAB
ALT SERPL W P-5'-P-CCNC: 39 U/L (ref 0–70)
ANION GAP SERPL CALCULATED.3IONS-SCNC: 7 MMOL/L (ref 3–14)
BUN SERPL-MCNC: 15 MG/DL (ref 7–30)
CALCIUM SERPL-MCNC: 9.7 MG/DL (ref 8.5–10.1)
CHLORIDE SERPL-SCNC: 107 MMOL/L (ref 94–109)
CHOLEST SERPL-MCNC: 142 MG/DL
CO2 SERPL-SCNC: 26 MMOL/L (ref 20–32)
CREAT SERPL-MCNC: 1.13 MG/DL (ref 0.66–1.25)
GFR SERPL CREATININE-BSD FRML MDRD: 73 ML/MIN/{1.73_M2}
GLUCOSE SERPL-MCNC: 81 MG/DL (ref 70–99)
HDLC SERPL-MCNC: 44 MG/DL
LDLC SERPL CALC-MCNC: 68 MG/DL
NONHDLC SERPL-MCNC: 98 MG/DL
POTASSIUM SERPL-SCNC: 4.1 MMOL/L (ref 3.4–5.3)
SODIUM SERPL-SCNC: 140 MMOL/L (ref 133–144)
TRIGL SERPL-MCNC: 150 MG/DL

## 2021-02-12 PROCEDURE — 80061 LIPID PANEL: CPT | Performed by: PHYSICIAN ASSISTANT

## 2021-02-12 PROCEDURE — 99214 OFFICE O/P EST MOD 30 MIN: CPT | Performed by: PHYSICIAN ASSISTANT

## 2021-02-12 PROCEDURE — 36415 COLL VENOUS BLD VENIPUNCTURE: CPT | Performed by: PHYSICIAN ASSISTANT

## 2021-02-12 PROCEDURE — 80048 BASIC METABOLIC PNL TOTAL CA: CPT | Performed by: PHYSICIAN ASSISTANT

## 2021-02-12 PROCEDURE — 84460 ALANINE AMINO (ALT) (SGPT): CPT | Performed by: PHYSICIAN ASSISTANT

## 2021-02-12 RX ORDER — CLOPIDOGREL BISULFATE 75 MG/1
75 TABLET ORAL DAILY
Qty: 90 TABLET | Refills: 3 | Status: SHIPPED | OUTPATIENT
Start: 2021-02-12 | End: 2022-02-07

## 2021-02-12 RX ORDER — LISINOPRIL 10 MG/1
10 TABLET ORAL DAILY
Qty: 90 TABLET | Refills: 3 | Status: SHIPPED | OUTPATIENT
Start: 2021-02-12 | End: 2021-03-17

## 2021-02-12 RX ORDER — NITROGLYCERIN 0.4 MG/1
TABLET SUBLINGUAL
Qty: 25 TABLET | Refills: 0 | Status: SHIPPED | OUTPATIENT
Start: 2021-02-12 | End: 2022-04-08

## 2021-02-12 RX ORDER — METOPROLOL SUCCINATE 50 MG/1
50 TABLET, EXTENDED RELEASE ORAL DAILY
Qty: 90 TABLET | Refills: 3 | Status: SHIPPED | OUTPATIENT
Start: 2021-02-12 | End: 2022-01-24

## 2021-02-12 RX ORDER — ROSUVASTATIN CALCIUM 40 MG/1
40 TABLET, COATED ORAL DAILY
Qty: 90 TABLET | Refills: 3 | Status: SHIPPED | OUTPATIENT
Start: 2021-02-12 | End: 2022-01-24

## 2021-02-12 NOTE — TELEPHONE ENCOUNTER
"Pt called requesting refills for his prescriptions, is leaving for [place], [place] today and needs refills on his meds before he leaves.     OV note 2/12/21 w/ Savita: \"I will have Matty stop by lab today for lipid panel and BMP. His most recent lipid panel shows suboptimal values. If his numbers are not better, I will add zetia to his regimen. I recommended he quits smoking. I offered nuclear stress scan but patient wanted to wait and monitor. I recommended he resumes cardio workout. I did not think he needs to continue with plavix as he is more than a year out since his last PCI. He has not had bleedind issues. After some discussion we agreed to keep him on it for another year.\"     Refills sent for patient's medications and pt made aware. Lab results are still pending, will  zetia upon his return if needed.   "

## 2021-02-12 NOTE — PATIENT INSTRUCTIONS
Today's Plan:   1) OK to continue with Plavix for another year.     If you have questions or concerns please call my nurse team at [phone].     Scheduling phone number: [phone]  Reminder: Please bring in all current medications, over the counter supplements and vitamin bottles to your next appointment.    It was a pleasure seeing you today!     Savita Love PA-C  2/12/2021

## 2021-02-12 NOTE — LETTER
2/12/2021    Physician No Ref-Primary  No address on file    RE: Matty Demarco Bambi       Dear Colleague,    I had the pleasure of seeing Matty Demarco Bambi in the [facility].    Primary Cardiologist: Dr. Epstein     Reason For Visit: Annual follow up    History of Present Illness:   Matty is a pleasant 56 year old male with past medical history notable for CAD (Hx of PCI to RCA and LCX in  2009 and 2010, respectively; inf STEMI in 5/2019 receiving stent to mid RCA with staged PCI to the D1; on aspirin), HTN, hx of tobacco use, and HLD.     Matty unfortunately started smoking again but he is planning to quit in the near future. He remains on plavix and aspirin with no bleeding issues. He once in a while feels his heart pounding but not necessarily faster. He has some episodes of CP but these are self limiting and not long in duration with no specific association to exercise. He tends to get them when he does not have enough sleep and/or smokes a lot. He would like to continue with plavix for one more year if possible until he is back to exercising and quits smoking.     Assessment and Plan:   Matty is a pleasant 56 year old male with past medical history notable for CAD (hx of inf STEMI in 5/2019 receiving stent to RCA with staged PCI to the D1; on aspirin), HTN, and HLD.     I will have Matty stop by lab today for lipid panel and BMP. His most recent lipid panel shows suboptimal values. If his numbers are not better, I will add zetia to his regimen. I recommended he quits smoking. I offered nuclear stress scan but patient wanted to wait and monitor. I recommended he resumes cardio workout. I did not think he needs to continue with plavix as he is more than a year out since his last PCI. He has not had bleedind issues. After some discussion we agreed to keep him on it for another year. He is aware of bleeding risks with being on two blood thinners. I will review  the lab results once finalized and send a message on eSight.     This note was completed in part using Dragon voice recognition software. Although reviewed after completion, some word and grammatical errors may occur.    Orders this Visit:  Orders Placed This Encounter   Procedures     Lipid Profile     ALT     Basic metabolic panel     No orders of the defined types were placed in this encounter.    Medications Discontinued During This Encounter   Medication Reason     ticagrelor (BRILINTA) 90 MG tablet          Encounter Diagnoses   Name Primary?     Coronary artery disease involving native coronary artery of native heart without angina pectoris Yes     Hyperlipidemia LDL goal <70        CURRENT MEDICATIONS:  Current Outpatient Medications   Medication Sig Dispense Refill     aspirin (ASA) 81 MG EC tablet Take 1 tablet (81 mg) by mouth daily 30 tablet 0     clopidogrel (PLAVIX) 75 MG tablet Take 1 tablet (75 mg) by mouth daily The morning after you take your last Brillinta, take 8 Plavix tablets (600 mg). Then take 75 mg daily thereafter. 90 tablet 0     clopidogrel (PLAVIX) 75 MG tablet Take 1 tablet (75 mg) by mouth daily 90 tablet 3     lisinopril (ZESTRIL) 10 MG tablet Take 1 tablet (10 mg) by mouth daily 90 tablet 3     lisinopril (ZESTRIL) 10 MG tablet Take 1 tablet (10 mg) by mouth daily 90 tablet 3     metoprolol succinate ER (TOPROL-XL) 50 MG 24 hr tablet Take 1 tablet (50 mg) by mouth daily 30 tablet 0     metoprolol succinate ER (TOPROL-XL) 50 MG 24 hr tablet Take 1 tablet (50 mg) by mouth daily 90 tablet 0     nitroGLYcerin (NITROSTAT) 0.4 MG sublingual tablet For chest pain place 1 tablet under the tongue every 5 minutes for 3 doses. If symptoms persist 5 minutes after 1st dose call 911. 10 tablet 0     rosuvastatin (CRESTOR) 40 MG tablet Take 1 tablet (40 mg) by mouth daily 90 tablet 3     rosuvastatin (CRESTOR) 40 MG tablet Take 1 tablet (40 mg) by mouth daily 90 tablet 3       ALLERGIES   No Known  Allergies    PAST MEDICAL HISTORY:  Past Medical History:   Diagnosis Date     Benign essential hypertension 2021     Mixed hyperlipidemia 2021     Myocardial infarction (H)        PAST SURGICAL HISTORY:  Past Surgical History:   Procedure Laterality Date     CV HEART CATHETERIZATION WITH POSSIBLE INTERVENTION N/A 2019    Procedure: Heart Catheterization with possible Intervention;  Surgeon: Ashanti Epstein MD;  Location:  HEART CARDIAC CATH LAB     CV HEART CATHETERIZATION WITH POSSIBLE INTERVENTION N/A 2019    Procedure: Heart Catheterization with Possible Intervention;  Surgeon: Ashanti Epstein MD;  Location:  HEART CARDIAC CATH LAB     CV LEFT HEART CATH N/A 2019    Procedure: Left Heart Cath;  Surgeon: Ashanti Epstein MD;  Location:  HEART CARDIAC CATH LAB     CV PCI STENT DRUG ELUTING N/A 2019    Procedure: PCI Stent Drug Eluting;  Surgeon: Ashanti Epstein MD;  Location:  HEART CARDIAC CATH LAB     CV PCI STENT DRUG ELUTING N/A 2019    Procedure: PCI Stent Drug Eluting;  Surgeon: Ashanti Epstein MD;  Location:  HEART CARDIAC CATH LAB       FAMILY HISTORY:  Family History   Problem Relation Age of Onset     Coronary Artery Disease Maternal Grandfather        SOCIAL HISTORY:  Social History     Socioeconomic History     Marital status: Single     Spouse name: None     Number of children: None     Years of education: None     Highest education level: None   Occupational History     None   Social Needs     Financial resource strain: None     Food insecurity     Worry: None     Inability: None     Transportation needs     Medical: None     Non-medical: None   Tobacco Use     Smoking status: Former Smoker     Packs/day: 0.50     Years: 6.00     Pack years: 3.00     Types: Cigarettes     Quit date: 2019     Years since quittin.7     Smokeless tobacco: Never Used   Substance and Sexual Activity     Alcohol use: Yes     Comment: monthly      Drug use: Never     Sexual activity: None   Lifestyle     Physical activity     Days per week: None     Minutes per session: None     Stress: None   Relationships     Social connections     Talks on phone: None     Gets together: None     Attends Yarsanism service: None     Active member of club or organization: None     Attends meetings of clubs or organizations: None     Relationship status: None     Intimate partner violence     Fear of current or ex partner: None     Emotionally abused: None     Physically abused: None     Forced sexual activity: None   Other Topics Concern     Parent/sibling w/ CABG, MI or angioplasty before 65F 55M? Not Asked   Social History Narrative     None       Review of Systems:  Skin:  Negative     Eyes:  Positive for glasses  ENT:  Negative    Respiratory:  Positive for    Cardiovascular:    Positive for  Gastroenterology: Negative    Genitourinary:  Negative    Musculoskeletal:  Negative    Neurologic:  Negative    Psychiatric:  Negative    Heme/Lymph/Imm:  Negative    Endocrine:  Negative      Physical Exam:  Vitals: /76   Pulse 68   Wt 76.2 kg (168 lb)   SpO2 98%   BMI 22.78 kg/m       GEN:  NAD  NECK: No JVD  C/V:  Regular rate and rhythm, no murmur, rub or gallop.  RESP: Clear to auscultation bilaterally without wheezing, rales, or rhonchi.  GI: Abdomen soft, nontender, nondistended. No HSM appreciated.   EXTREM: No LE edema.   NEURO: Alert and oriented, cooperative. No obvious focal deficits.   PSYCH: Normal affect.  SKIN: Warm and dry.       Recent Lab Results:  LIPID RESULTS:  Lab Results   Component Value Date    CHOL 155 05/29/2020    HDL 43 05/29/2020    LDL 90 05/29/2020    TRIG 112 05/29/2020       LIVER ENZYME RESULTS:  No results found for: AST, ALT    CBC RESULTS:  Lab Results   Component Value Date    WBC 7.7 05/22/2019    RBC 4.70 05/22/2019    HGB 15.6 05/22/2019    HCT 43.8 05/22/2019    MCV 93 05/22/2019    MCH 33.2 (H) 05/22/2019    MCHC 35.6  05/22/2019    RDW 11.9 05/22/2019     05/22/2019       BMP RESULTS:  Lab Results   Component Value Date     05/29/2020    POTASSIUM 4.6 05/29/2020    CHLORIDE 106 05/29/2020    CO2 27 05/29/2020    ANIONGAP 4 05/29/2020    GLC 96 05/29/2020    BUN 15 05/29/2020    CR 1.18 05/29/2020    GFRESTIMATED 69 05/29/2020    GFRESTBLACK 80 05/29/2020    CHRISTINE 9.7 05/29/2020        A1C RESULTS:  No results found for: A1C    INR RESULTS:  Lab Results   Component Value Date    INR 1.15 (H) 05/22/2019    INR 0.93 05/06/2019           Savita Love PA-C  February 12, 2021         Thank you for allowing me to participate in the care of your patient.      Sincerely,     Savita Love PA-C     [facility]  cc:   CURT Mehta CNP  [address]  [place] [id number]

## 2021-02-12 NOTE — PROGRESS NOTES
Primary Cardiologist: Dr. Epstein     Reason For Visit: Annual follow up    History of Present Illness:   Matty is a pleasant 56 year old male with past medical history notable for CAD (Hx of PCI to RCA and LCX in  2009 and 2010, respectively; inf STEMI in 5/2019 receiving stent to mid RCA with staged PCI to the D1; on aspirin), HTN, hx of tobacco use, and HLD.     Matty unfortunately started smoking again but he is planning to quit in the near future. He remains on plavix and aspirin with no bleeding issues. He once in a while feels his heart pounding but not necessarily faster. He has some episodes of CP but these are self limiting and not long in duration with no specific association to exercise. He tends to get them when he does not have enough sleep and/or smokes a lot. He would like to continue with plavix for one more year if possible until he is back to exercising and quits smoking.     Assessment and Plan:   Matty is a pleasant 56 year old male with past medical history notable for CAD (hx of inf STEMI in 5/2019 receiving stent to RCA with staged PCI to the D1; on aspirin), HTN, and HLD.     I will have Matty stop by lab today for lipid panel and BMP. His most recent lipid panel shows suboptimal values. If his numbers are not better, I will add zetia to his regimen. I recommended he quits smoking. I offered nuclear stress scan but patient wanted to wait and monitor. I recommended he resumes cardio workout. I did not think he needs to continue with plavix as he is more than a year out since his last PCI. He has not had bleedind issues. After some discussion we agreed to keep him on it for another year. He is aware of bleeding risks with being on two blood thinners. I will review the lab results once finalized and send a message on Sypherlink.     This note was completed in part using Dragon voice recognition software. Although reviewed after completion, some word and grammatical errors may occur.    Orders this  Visit:  Orders Placed This Encounter   Procedures     Lipid Profile     ALT     Basic metabolic panel     No orders of the defined types were placed in this encounter.    Medications Discontinued During This Encounter   Medication Reason     ticagrelor (BRILINTA) 90 MG tablet          Encounter Diagnoses   Name Primary?     Coronary artery disease involving native coronary artery of native heart without angina pectoris Yes     Hyperlipidemia LDL goal <70        CURRENT MEDICATIONS:  Current Outpatient Medications   Medication Sig Dispense Refill     aspirin (ASA) 81 MG EC tablet Take 1 tablet (81 mg) by mouth daily 30 tablet 0     clopidogrel (PLAVIX) 75 MG tablet Take 1 tablet (75 mg) by mouth daily The morning after you take your last Brillinta, take 8 Plavix tablets (600 mg). Then take 75 mg daily thereafter. 90 tablet 0     clopidogrel (PLAVIX) 75 MG tablet Take 1 tablet (75 mg) by mouth daily 90 tablet 3     lisinopril (ZESTRIL) 10 MG tablet Take 1 tablet (10 mg) by mouth daily 90 tablet 3     lisinopril (ZESTRIL) 10 MG tablet Take 1 tablet (10 mg) by mouth daily 90 tablet 3     metoprolol succinate ER (TOPROL-XL) 50 MG 24 hr tablet Take 1 tablet (50 mg) by mouth daily 30 tablet 0     metoprolol succinate ER (TOPROL-XL) 50 MG 24 hr tablet Take 1 tablet (50 mg) by mouth daily 90 tablet 0     nitroGLYcerin (NITROSTAT) 0.4 MG sublingual tablet For chest pain place 1 tablet under the tongue every 5 minutes for 3 doses. If symptoms persist 5 minutes after 1st dose call 911. 10 tablet 0     rosuvastatin (CRESTOR) 40 MG tablet Take 1 tablet (40 mg) by mouth daily 90 tablet 3     rosuvastatin (CRESTOR) 40 MG tablet Take 1 tablet (40 mg) by mouth daily 90 tablet 3       ALLERGIES   No Known Allergies    PAST MEDICAL HISTORY:  Past Medical History:   Diagnosis Date     Benign essential hypertension 2/1/2021     Mixed hyperlipidemia 2/1/2021     Myocardial infarction (H)        PAST SURGICAL HISTORY:  Past Surgical  History:   Procedure Laterality Date     CV HEART CATHETERIZATION WITH POSSIBLE INTERVENTION N/A 2019    Procedure: Heart Catheterization with possible Intervention;  Surgeon: Ashanti Epstein MD;  Location:  HEART CARDIAC CATH LAB     CV HEART CATHETERIZATION WITH POSSIBLE INTERVENTION N/A 2019    Procedure: Heart Catheterization with Possible Intervention;  Surgeon: Ashanti Epstein MD;  Location:  HEART CARDIAC CATH LAB     CV LEFT HEART CATH N/A 2019    Procedure: Left Heart Cath;  Surgeon: Ashanti Epstein MD;  Location:  HEART CARDIAC CATH LAB     CV PCI STENT DRUG ELUTING N/A 2019    Procedure: PCI Stent Drug Eluting;  Surgeon: Ashanti Epstein MD;  Location:  HEART CARDIAC CATH LAB     CV PCI STENT DRUG ELUTING N/A 2019    Procedure: PCI Stent Drug Eluting;  Surgeon: Ashanti Epstein MD;  Location:  HEART CARDIAC CATH LAB       FAMILY HISTORY:  Family History   Problem Relation Age of Onset     Coronary Artery Disease Maternal Grandfather        SOCIAL HISTORY:  Social History     Socioeconomic History     Marital status: Single     Spouse name: None     Number of children: None     Years of education: None     Highest education level: None   Occupational History     None   Social Needs     Financial resource strain: None     Food insecurity     Worry: None     Inability: None     Transportation needs     Medical: None     Non-medical: None   Tobacco Use     Smoking status: Former Smoker     Packs/day: 0.50     Years: 6.00     Pack years: 3.00     Types: Cigarettes     Quit date: 2019     Years since quittin.7     Smokeless tobacco: Never Used   Substance and Sexual Activity     Alcohol use: Yes     Comment: monthly     Drug use: Never     Sexual activity: None   Lifestyle     Physical activity     Days per week: None     Minutes per session: None     Stress: None   Relationships     Social connections     Talks on phone: None     Gets  together: None     Attends Oriental orthodox service: None     Active member of club or organization: None     Attends meetings of clubs or organizations: None     Relationship status: None     Intimate partner violence     Fear of current or ex partner: None     Emotionally abused: None     Physically abused: None     Forced sexual activity: None   Other Topics Concern     Parent/sibling w/ CABG, MI or angioplasty before 65F 55M? Not Asked   Social History Narrative     None       Review of Systems:  Skin:  Negative     Eyes:  Positive for glasses  ENT:  Negative    Respiratory:  Positive for    Cardiovascular:    Positive for  Gastroenterology: Negative    Genitourinary:  Negative    Musculoskeletal:  Negative    Neurologic:  Negative    Psychiatric:  Negative    Heme/Lymph/Imm:  Negative    Endocrine:  Negative      Physical Exam:  Vitals: /76   Pulse 68   Wt 76.2 kg (168 lb)   SpO2 98%   BMI 22.78 kg/m       GEN:  NAD  NECK: No JVD  C/V:  Regular rate and rhythm, no murmur, rub or gallop.  RESP: Clear to auscultation bilaterally without wheezing, rales, or rhonchi.  GI: Abdomen soft, nontender, nondistended. No HSM appreciated.   EXTREM: No LE edema.   NEURO: Alert and oriented, cooperative. No obvious focal deficits.   PSYCH: Normal affect.  SKIN: Warm and dry.       Recent Lab Results:  LIPID RESULTS:  Lab Results   Component Value Date    CHOL 155 05/29/2020    HDL 43 05/29/2020    LDL 90 05/29/2020    TRIG 112 05/29/2020       LIVER ENZYME RESULTS:  No results found for: AST, ALT    CBC RESULTS:  Lab Results   Component Value Date    WBC 7.7 05/22/2019    RBC 4.70 05/22/2019    HGB 15.6 05/22/2019    HCT 43.8 05/22/2019    MCV 93 05/22/2019    MCH 33.2 (H) 05/22/2019    MCHC 35.6 05/22/2019    RDW 11.9 05/22/2019     05/22/2019       BMP RESULTS:  Lab Results   Component Value Date     05/29/2020    POTASSIUM 4.6 05/29/2020    CHLORIDE 106 05/29/2020    CO2 27 05/29/2020    ANIONGAP 4  05/29/2020    GLC 96 05/29/2020    BUN 15 05/29/2020    CR 1.18 05/29/2020    GFRESTIMATED 69 05/29/2020    GFRESTBLACK 80 05/29/2020    CHRISTINE 9.7 05/29/2020        A1C RESULTS:  No results found for: A1C    INR RESULTS:  Lab Results   Component Value Date    INR 1.15 (H) 05/22/2019    INR 0.93 05/06/2019           Savita Love PA-C  February 12, 2021

## 2021-03-17 ENCOUNTER — OFFICE VISIT (OUTPATIENT)
Dept: URGENT CARE | Facility: URGENT CARE | Age: 56
End: 2021-03-17

## 2021-03-17 ENCOUNTER — TELEPHONE (OUTPATIENT)
Dept: CARDIOLOGY | Facility: CLINIC | Age: 56
End: 2021-03-17

## 2021-03-17 VITALS
DIASTOLIC BLOOD PRESSURE: 87 MMHG | OXYGEN SATURATION: 97 % | HEIGHT: 72 IN | TEMPERATURE: 97.9 F | SYSTOLIC BLOOD PRESSURE: 137 MMHG | HEART RATE: 76 BPM | BODY MASS INDEX: 23.7 KG/M2 | RESPIRATION RATE: 18 BRPM | WEIGHT: 175 LBS

## 2021-03-17 DIAGNOSIS — T14.8XXA NERVE INJURY: Primary | ICD-10-CM

## 2021-03-17 DIAGNOSIS — I10 BENIGN ESSENTIAL HYPERTENSION: Primary | ICD-10-CM

## 2021-03-17 DIAGNOSIS — M53.3 PAIN IN THE COCCYX: ICD-10-CM

## 2021-03-17 PROCEDURE — 99203 OFFICE O/P NEW LOW 30 MIN: CPT | Performed by: PHYSICIAN ASSISTANT

## 2021-03-17 RX ORDER — LOSARTAN POTASSIUM 25 MG/1
25 TABLET ORAL DAILY
Qty: 90 TABLET | Refills: 3 | Status: SHIPPED | OUTPATIENT
Start: 2021-03-17 | End: 2022-01-25

## 2021-03-17 RX ORDER — GABAPENTIN 300 MG/1
300 CAPSULE ORAL 3 TIMES DAILY PRN
Qty: 20 CAPSULE | Refills: 0 | Status: SHIPPED | OUTPATIENT
Start: 2021-03-17 | End: 2022-02-07

## 2021-03-17 ASSESSMENT — MIFFLIN-ST. JEOR: SCORE: 1666.79

## 2021-03-17 NOTE — TELEPHONE ENCOUNTER
Message from patient, he states he is having a dry cough at night again and believes it is from his lisinopril 10 mg daily. He had this issue in 2019 and tried switching to losartan 25 mg daily, but decided at that time that he did not think it changed his cough much. He went back on lisinopril after a few months.    1030 spoke with patient, he states he would like to try the losartan again. He thinks his smoking was part of the issue when he was sorting out the cough the last time.  Patient states he stopped the lisinopril on his own 3 days ago and can tell his cough is better. He has not smoked for 6-7 months.    Patient asks that his script be sent to the [email or web address] in [place]. He lives in [place] but does his medical care in [place] and picks up scripts on the weekend when he is town.    Will message STEFFANY Savita Love to review      [id number] reply from STEFFANY Savita Love:  Yes, it is fine to switch to losartan.     Savita     Spoke with patient and escripted new losartan 25mg daily in place of lisinopril.

## 2021-03-17 NOTE — PROGRESS NOTES
"  Assessment/Plan:    Pt declines X-ray of coccyx which I feel is reasonable given he has no tenderness to the area. Suspect a bruised nerve near the coccyx, Rx gabapentin. Avoid NSAIDs due to cardiac history. Tylenol PRN. Ice/heat also advised.  See patient instructions below.    At the end of the encounter, I discussed results, diagnosis, medications. Discussed red flags for immediate return to clinic/ER, as well as indications for follow up if no improvement. Patient understood and agreed to plan. Patient was stable for discharge.      ICD-10-CM    1. Nerve injury  T14.8XXA gabapentin (NEURONTIN) 300 MG capsule   2. Pain in the coccyx  M53.3          Return in about 2 weeks (around 3/31/2021) for Follow up w/ primary care provider if not better.    JEFF [place], [facility]  -----------------------------------------------------------------------------------------------------------------------------------------------------    HPI:  Matty Lacy is a 56 year old male with hx of CAD/MI (s/p stent placement), HTN who presents for evaluation of pain in tailbone area onset 3 days ago. Patient was playing volleyball and jumped up to hit the ball, and fell backward onto his tailbone. He notes a sharp, tearing type \"nerve pain\" in that area primarily when getting up from a seated position or raising his legs on stairs or to get into bed. The pain does not radiate. The area is not tender to the touch. He has no pain with BMs. No treatments tried. Patient reports no fever/chills, back pain, saddle anesthesia, bowel/bladder incontinence, abdominal pain, nausea, vomiting, rash, or any other symptoms.     Past Medical History:   Diagnosis Date     Benign essential hypertension 2/1/2021     Mixed hyperlipidemia 2/1/2021     Myocardial infarction (H)        Vitals:    03/17/21 0913   BP: 137/87   Pulse: 76   Resp: 18   Temp: 97.9  F (36.6  C)   SpO2: 97%   Weight: 79.4 kg (175 lb) "   Height: 1.829 m (6')       Physical Exam  Vitals signs and nursing note reviewed.   Pulmonary:      Effort: Pulmonary effort is normal.   Musculoskeletal:      Right hip: He exhibits no bony tenderness.      Left hip: He exhibits no bony tenderness.      Lumbar back: He exhibits no bony tenderness and no deformity.        Back:    Neurological:      Mental Status: He is alert.      Comments: Pt ambulatory         Labs/Imaging:  No results found for this or any previous visit (from the past 24 hour(s)).      Patient Instructions   Try ice & heat to the area, 10-15 mins at a time, 3-4 times/day.

## 2021-03-27 ENCOUNTER — IMMUNIZATION (OUTPATIENT)
Dept: NURSING | Facility: CLINIC | Age: 56
End: 2021-03-27
Payer: OTHER GOVERNMENT

## 2021-03-27 PROCEDURE — 0011A PR COVID VAC MODERNA 100 MCG/0.5 ML IM: CPT

## 2021-03-27 PROCEDURE — 91301 PR COVID VAC MODERNA 100 MCG/0.5 ML IM: CPT

## 2021-04-24 ENCOUNTER — IMMUNIZATION (OUTPATIENT)
Dept: NURSING | Facility: CLINIC | Age: 56
End: 2021-04-24
Attending: SPECIALIST
Payer: OTHER GOVERNMENT

## 2021-04-24 PROCEDURE — 0012A PR COVID VAC MODERNA 100 MCG/0.5 ML IM: CPT

## 2021-04-24 PROCEDURE — 91301 PR COVID VAC MODERNA 100 MCG/0.5 ML IM: CPT

## 2021-09-12 ENCOUNTER — HEALTH MAINTENANCE LETTER (OUTPATIENT)
Age: 56
End: 2021-09-12

## 2021-11-02 ENCOUNTER — TELEPHONE (OUTPATIENT)
Dept: NURSING | Facility: CLINIC | Age: 56
End: 2021-11-02

## 2021-11-02 NOTE — TELEPHONE ENCOUNTER
Telephone call:     Pt has been getting emails and notices about booster shots and additional vaccinations that he needs.     Writer reviewed immunization guideline from [facility] and he was then transferred to [facility] for making an appointment to establish PCP and for vaccines.     Alice Hinton RN  [facility] Nurse Advisor 12:01 PM 11/2/2021     COVID 19 Nurse Triage Plan/Patient Instructions    Please be aware that novel coronavirus (COVID-19) may be circulating in the community. If you develop symptoms such as fever, cough, or SOB or if you have concerns about the presence of another infection including coronavirus (COVID-19), please contact your health care provider or visit [email or web address].     Disposition/Instructions    Home care recommended. Follow home care protocol based instructions.    Thank you for taking steps to prevent the spread of this virus.  o Limit your contact with others.  o Wear a simple mask to cover your cough.  o Wash your hands well and often.    Resources    [facility]: About COVID-19: [email or web address]    CDC: What to Do If You're Sick: www.cdc.gov/coronavirus/2019-ncov/about/steps-when-sick.html    CDC: Ending Home Isolation: www.cdc.gov/coronavirus/2019-ncov/hcp/disposition-in-home-patients.html     CDC: Caring for Someone: www.cdc.gov/coronavirus/2019-ncov/if-you-are-sick/care-for-someone.html     [facility]: Interim Guidance for Hospital Discharge to Home: www.health.[facility]/diseases/coronavirus/hcp/hospdischarge.pdf    [facility] clinical trials (COVID-19 research studies): [email or web address]-trials     Below are the COVID-19 hotlines at the [facility] ([facility]). Interpreters are available.   o For health questions: Call 136-142-1170 or 1-850.913.9591 (7 a.m. to 7 p.m.)  o For questions about schools and childcare: Call 971-048-3729 or 1-989.987.6034 (7 a.m. to 7 p.m.)

## 2022-01-02 ENCOUNTER — HEALTH MAINTENANCE LETTER (OUTPATIENT)
Age: 57
End: 2022-01-02

## 2022-01-24 DIAGNOSIS — I25.10 CORONARY ARTERY DISEASE INVOLVING NATIVE CORONARY ARTERY OF NATIVE HEART WITHOUT ANGINA PECTORIS: ICD-10-CM

## 2022-01-24 DIAGNOSIS — E78.5 HYPERLIPIDEMIA LDL GOAL <70: ICD-10-CM

## 2022-01-24 RX ORDER — METOPROLOL SUCCINATE 50 MG/1
50 TABLET, EXTENDED RELEASE ORAL DAILY
Qty: 90 TABLET | Refills: 0 | Status: SHIPPED | OUTPATIENT
Start: 2022-01-24 | End: 2022-04-27

## 2022-01-24 RX ORDER — ROSUVASTATIN CALCIUM 40 MG/1
40 TABLET, COATED ORAL DAILY
Qty: 90 TABLET | Refills: 0 | Status: SHIPPED | OUTPATIENT
Start: 2022-01-24 | End: 2022-04-27

## 2022-01-25 DIAGNOSIS — I10 BENIGN ESSENTIAL HYPERTENSION: ICD-10-CM

## 2022-01-25 RX ORDER — LOSARTAN POTASSIUM 25 MG/1
25 TABLET ORAL DAILY
Qty: 90 TABLET | Refills: 0 | Status: SHIPPED | OUTPATIENT
Start: 2022-01-25 | End: 2022-04-27

## 2022-02-07 ENCOUNTER — LAB (OUTPATIENT)
Dept: LAB | Facility: CLINIC | Age: 57
End: 2022-02-07
Payer: COMMERCIAL

## 2022-02-07 ENCOUNTER — TELEPHONE (OUTPATIENT)
Dept: CARDIOLOGY | Facility: CLINIC | Age: 57
End: 2022-02-07

## 2022-02-07 ENCOUNTER — OFFICE VISIT (OUTPATIENT)
Dept: CARDIOLOGY | Facility: CLINIC | Age: 57
End: 2022-02-07
Payer: COMMERCIAL

## 2022-02-07 VITALS
DIASTOLIC BLOOD PRESSURE: 82 MMHG | BODY MASS INDEX: 24.88 KG/M2 | OXYGEN SATURATION: 97 % | HEART RATE: 69 BPM | WEIGHT: 183.7 LBS | HEIGHT: 72 IN | SYSTOLIC BLOOD PRESSURE: 116 MMHG

## 2022-02-07 DIAGNOSIS — I25.10 CORONARY ARTERY DISEASE INVOLVING NATIVE CORONARY ARTERY OF NATIVE HEART WITHOUT ANGINA PECTORIS: ICD-10-CM

## 2022-02-07 DIAGNOSIS — R07.9 CHEST PAIN, UNSPECIFIED TYPE: Primary | ICD-10-CM

## 2022-02-07 DIAGNOSIS — E78.5 HYPERLIPIDEMIA LDL GOAL <70: ICD-10-CM

## 2022-02-07 LAB
ALT SERPL W P-5'-P-CCNC: 51 U/L (ref 0–70)
ANION GAP SERPL CALCULATED.3IONS-SCNC: 7 MMOL/L (ref 3–14)
BUN SERPL-MCNC: 18 MG/DL (ref 7–30)
CALCIUM SERPL-MCNC: 9.6 MG/DL (ref 8.5–10.1)
CHLORIDE BLD-SCNC: 105 MMOL/L (ref 94–109)
CHOLEST SERPL-MCNC: 155 MG/DL
CO2 SERPL-SCNC: 27 MMOL/L (ref 20–32)
CREAT SERPL-MCNC: 1.24 MG/DL (ref 0.66–1.25)
FASTING STATUS PATIENT QL REPORTED: YES
GFR SERPL CREATININE-BSD FRML MDRD: 69 ML/MIN/1.73M2
GLUCOSE BLD-MCNC: 108 MG/DL (ref 70–99)
HDLC SERPL-MCNC: 49 MG/DL
LDLC SERPL CALC-MCNC: 77 MG/DL
NONHDLC SERPL-MCNC: 106 MG/DL
POTASSIUM BLD-SCNC: 4.3 MMOL/L (ref 3.4–5.3)
SODIUM SERPL-SCNC: 139 MMOL/L (ref 133–144)
TRIGL SERPL-MCNC: 143 MG/DL

## 2022-02-07 PROCEDURE — 80048 BASIC METABOLIC PNL TOTAL CA: CPT

## 2022-02-07 PROCEDURE — 36415 COLL VENOUS BLD VENIPUNCTURE: CPT

## 2022-02-07 PROCEDURE — 93000 ELECTROCARDIOGRAM COMPLETE: CPT | Performed by: INTERNAL MEDICINE

## 2022-02-07 PROCEDURE — 80061 LIPID PANEL: CPT

## 2022-02-07 PROCEDURE — 99214 OFFICE O/P EST MOD 30 MIN: CPT | Performed by: INTERNAL MEDICINE

## 2022-02-07 PROCEDURE — 84460 ALANINE AMINO (ALT) (SGPT): CPT

## 2022-02-07 ASSESSMENT — MIFFLIN-ST. JEOR: SCORE: 1706.26

## 2022-02-07 NOTE — TELEPHONE ENCOUNTER
Alma Delia Boateng [facility] 4  Hello,   This pt saw Mich today   He has many questions about his health care. He scheduled his stress test with me   I told him [facility]'s team would reach out today or tomorrow     Thanks!   Alma Delia     Contacted patient to review questions further. Patient states that he wants a recommendations for a PCP, as he does have one. Patient is wondering if Dr. Epps has any recommendations within [place]. Patient would like his response via YOOSE instead of phone call. Will route to Dr. Epps for review.

## 2022-02-07 NOTE — LETTER
2/7/2022    Physician No Ref-Primary  No address on file    RE: Matty Lacy       Dear Colleague,     I had the pleasure of seeing Matty Lacy in the [facility].  CARDIOLOGY CLINIC VISIT  DATE OF SERVICE:  February 7, 2022    PRIMARY CARE PHYSICIAN:  Physician No Ref-Primary    HISTORY OF PRESENT ILLNESS:   Mr. Lacy is a  is a pleasant 56 year old male with past medical history notable for CAD (Hx of PCI to RCA and LCX in  2009 and 2010, respectively; inf STEMI in 5/2019 receiving stent to mid RCA with staged PCI to the D1; on aspirin), HTN, hx of tobacco use, and HLD.   He was last seen by Savita Love in 2/2021 and presents today for follow up.    Matty states that he has been experiencing intermittent chest pain that will move to his left lateral side.  This will occur at random and is not associated with activity.  This has been going on for several months and want to get to the bottom of what the symptoms are caused from.  He did mention symptoms similar to this to AURE Tirado in February 2021 however Matty feels that the symptoms are now more intense and somewhat different than before.  He admits to being anxious about his heart health given the recurrent episodes he has experienced in the past.  He is happy to report that he is no longer smoking and quit shortly after seeing GABRIEL Pelayo in February of last year.  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  He notes that it is difficult to find healthy meals while traveling.            PAST MEDICAL HISTORY:  Past Medical History:   Diagnosis Date     Benign essential hypertension 2/1/2021     Mixed hyperlipidemia 2/1/2021     Myocardial infarction (H)        MEDICATIONS:  Current Outpatient Medications   Medication     aspirin (ASA) 81 MG EC tablet     clopidogrel (PLAVIX) 75 MG tablet     gabapentin (NEURONTIN) 300 MG capsule     losartan (COZAAR) 25 MG tablet     metoprolol succinate ER  (TOPROL-XL) 50 MG 24 hr tablet     nitroGLYcerin (NITROSTAT) 0.4 MG sublingual tablet     rosuvastatin (CRESTOR) 40 MG tablet     No current facility-administered medications for this visit.       ALLERGIES:  Allergies   Allergen Reactions     Atorvastatin      Other reaction(s): Myalgias     No Known Allergies        SOCIAL HISTORY:  I have reviewed this patient's social history and updated it with pertinent information if needed. Matty Lacy  reports that he quit smoking about 2 years ago. His smoking use included cigarettes. He has a 3.00 pack-year smoking history. He has never used smokeless tobacco. He reports current alcohol use. He reports that he does not use drugs.    FAMILY HISTORY:  I have reviewed this patient's family history and updated it with pertinent information if needed.   Family History   Problem Relation Age of Onset     Coronary Artery Disease Maternal Grandfather        REVIEW OF SYSTEMS:  A complete ROS was obtained and the pertinent positives are outlined in the history of present illness above.  The remainder of systems is negative.      PHYSICAL EXAM:                     Vital Signs with Ranges     0 lbs 0 oz    Constitutional: awake, alert, no distress  Eyes: PERRL, sclera nonicteric  ENT: trachea midline  Respiratory: Clear to auscultation bilaterally  Cardiovascular: Regular rate and rhythm without murmurs rubs or gallops  GI: nondistended, nontender, bowel sounds present  Lymph/Hematologic: no lymphadenopathy  Skin: dry, no rash  Musculoskeletal: good muscle tone, strength 5/5 in upper and lower extremities  Neurologic: no focal deficits  Neuropsychiatric: appropriate affact    DATA:      EKG: Dated February 7, 2022 reviewed personally.  Normal sinus rhythm without diagnostic ST segment changes    ASSESSMENT:  1.  Chest pain: Atypical for cardiac etiology  2.  Coronary artery disease:  Hx of PCI to RCA and LCX in  2009 and 2010, respectively; inf STEMI in 5/2019 receiving  stent to mid RCA with staged PCI to the D1  3.  Hypertension: At goal  4.  Hyperlipidemia: On high intensity Crestor.  5.  Prior tobacco abuse: Quit in 2021    RECOMMENDATIONS:  1.  Plan for exercise stress echocardiogram for further risk stratification.  2.  Continue aspirin statin beta-blocker.  3.  Matty was commended on abstaining from all tobacco.  4.  We will review results of stress echocardiogram and available.  If no significant findings, he may follow-up in 1 year.      Cori Epps MD [facility]  February 6, 2022    Today's clinic visit entailed:  Review of the result(s) of each unique test - ecg, lipids  30 minutes spent on the date of the encounter doing chart review, history and exam, documentation and further activities per the note  Provider  Link to [facility] Help Grid     The level of medical decision making during this visit was of moderate complexity.      [facility]  cc:   Savita Love PA-C  [address] [address]

## 2022-02-07 NOTE — PROGRESS NOTES
CARDIOLOGY CLINIC VISIT  DATE OF SERVICE:  February 7, 2022    PRIMARY CARE PHYSICIAN:  Physician No Ref-Primary    HISTORY OF PRESENT ILLNESS:   Mr. Lacy is a  is a pleasant 56 year old male with past medical history notable for CAD (Hx of PCI to RCA and LCX in  2009 and 2010, respectively; inf STEMI in 5/2019 receiving stent to mid RCA with staged PCI to the D1; on aspirin), HTN, hx of tobacco use, and HLD.   He was last seen by Savita Love in 2/2021 and presents today for follow up.    Matty states that he has been experiencing intermittent chest pain that will move to his left lateral side.  This will occur at random and is not associated with activity.  This has been going on for several months and want to get to the bottom of what the symptoms are caused from.  He did mention symptoms similar to this to AURE Tirado in February 2021 however Matty feels that the symptoms are now more intense and somewhat different than before.  He admits to being anxious about his heart health given the recurrent episodes he has experienced in the past.  He is happy to report that he is no longer smoking and quit shortly after seeing GABRIEL Pelayo in February of last year.  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  He notes that it is difficult to find healthy meals while traveling.            PAST MEDICAL HISTORY:  Past Medical History:   Diagnosis Date     Benign essential hypertension 2/1/2021     Mixed hyperlipidemia 2/1/2021     Myocardial infarction (H)        MEDICATIONS:  Current Outpatient Medications   Medication     aspirin (ASA) 81 MG EC tablet     clopidogrel (PLAVIX) 75 MG tablet     gabapentin (NEURONTIN) 300 MG capsule     losartan (COZAAR) 25 MG tablet     metoprolol succinate ER (TOPROL-XL) 50 MG 24 hr tablet     nitroGLYcerin (NITROSTAT) 0.4 MG sublingual tablet     rosuvastatin (CRESTOR) 40 MG tablet     No current facility-administered medications for this visit.        ALLERGIES:  Allergies   Allergen Reactions     Atorvastatin      Other reaction(s): Myalgias     No Known Allergies        SOCIAL HISTORY:  I have reviewed this patient's social history and updated it with pertinent information if needed. Matty Lacy  reports that he quit smoking about 2 years ago. His smoking use included cigarettes. He has a 3.00 pack-year smoking history. He has never used smokeless tobacco. He reports current alcohol use. He reports that he does not use drugs.    FAMILY HISTORY:  I have reviewed this patient's family history and updated it with pertinent information if needed.   Family History   Problem Relation Age of Onset     Coronary Artery Disease Maternal Grandfather        REVIEW OF SYSTEMS:  A complete ROS was obtained and the pertinent positives are outlined in the history of present illness above.  The remainder of systems is negative.      PHYSICAL EXAM:                     Vital Signs with Ranges     0 lbs 0 oz    Constitutional: awake, alert, no distress  Eyes: PERRL, sclera nonicteric  ENT: trachea midline  Respiratory: Clear to auscultation bilaterally  Cardiovascular: Regular rate and rhythm without murmurs rubs or gallops  GI: nondistended, nontender, bowel sounds present  Lymph/Hematologic: no lymphadenopathy  Skin: dry, no rash  Musculoskeletal: good muscle tone, strength 5/5 in upper and lower extremities  Neurologic: no focal deficits  Neuropsychiatric: appropriate affact    DATA:      EKG: Dated February 7, 2022 reviewed personally.  Normal sinus rhythm without diagnostic ST segment changes    ASSESSMENT:  1.  Chest pain: Atypical for cardiac etiology  2.  Coronary artery disease:  Hx of PCI to RCA and LCX in  2009 and 2010, respectively; inf STEMI in 5/2019 receiving stent to mid RCA with staged PCI to the D1  3.  Hypertension: At goal  4.  Hyperlipidemia: On high intensity Crestor.  5.  Prior tobacco abuse: Quit in 2021    RECOMMENDATIONS:  1.  Plan for  exercise stress echocardiogram for further risk stratification.  2.  Continue aspirin statin beta-blocker.  3.  Matty was commended on abstaining from all tobacco.  4.  We will review results of stress echocardiogram and available.  If no significant findings, he may follow-up in 1 year.      Cori Epps MD [facility]  February 6, 2022    Today's clinic visit entailed:  Review of the result(s) of each unique test - ecg, lipids  30 minutes spent on the date of the encounter doing chart review, history and exam, documentation and further activities per the note  Provider  Link to [facility] Help Grid     The level of medical decision making during this visit was of moderate complexity.

## 2022-02-07 NOTE — PATIENT INSTRUCTIONS
-Continue current cardiac meds; no changes made today  -Exercise stress echocardiogram  -Follow up in 12 month with Dr. Epps with lipids prior

## 2022-02-14 NOTE — TELEPHONE ENCOUNTER
RN updated patient via [employer].     Dr. Epps's recommendations    I do not.  Please direct him to [facility].  Thanks,   Gayatri Epps

## 2022-04-07 NOTE — PROGRESS NOTES
SUBJECTIVE:   CC: Matty Lacy is an 57 year old male who presents for preventative health visit.       Patient has been advised of split billing requirements and indicates understanding: Yes  Healthy Habits:     Getting at least 3 servings of Calcium per day:  Yes    Bi-annual eye exam:  NO    Dental care twice a year:  Yes    Sleep apnea or symptoms of sleep apnea:  None    Diet:  Regular (no restrictions)    Frequency of exercise:  None    Duration of exercise:  N/A    Taking medications regularly:  Yes    Barriers to taking medications:  None    Medication side effects:  None    PHQ-2 Total Score: 0    Additional concerns today:  Yes      BACK:  Pain since July when he was lifting items to carry up 3 flights of stairs.      CAD:  With stents.  Sees cards. Medications noted.        Today's PHQ-2 Score:   PHQ-2 (  [employer]) 2022   Q1: Little interest or pleasure in doing things 0   Q2: Feeling down, depressed or hopeless 0   PHQ-2 Score 0       Abuse: Current or Past(Physical, Sexual or Emotional)- No  Do you feel safe in your environment? Yes    Have you ever done Advance Care Planning? (For example, a Health Directive, POLST, or a discussion with a medical provider or your loved ones about your wishes): No, advance care planning information given to patient to review.  Patient plans to discuss their wishes with loved ones or provider.      Social History     Tobacco Use     Smoking status: Former Smoker     Packs/day: 0.50     Years: 6.00     Pack years: 3.00     Types: Cigarettes     Quit date: 2019     Years since quittin.9     Smokeless tobacco: Never Used   Substance Use Topics     Alcohol use: Yes     Comment: couple beers monthly     If you drink alcohol do you typically have >3 drinks per day or >7 drinks per week? No    No flowsheet data found.    Last PSA: No results found for: PSA    Reviewed orders with patient. Reviewed health maintenance and updated orders accordingly - Yes  Lab  "work is in process    Reviewed and updated as needed this visit by clinical staff   Tobacco  Allergies  Meds              Reviewed and updated as needed this visit by Provider                     Review of Systems  CONSTITUTIONAL: NEGATIVE for fever, chills, change in weight  INTEGUMENTARY/SKIN: NEGATIVE for worrisome rashes, moles or lesions  EYES: NEGATIVE for vision changes or irritation  ENT: NEGATIVE for ear, mouth and throat problems  RESP: NEGATIVE for significant cough or SOB  CV: NEGATIVE for chest pain, palpitations or peripheral edema  GI: NEGATIVE for nausea, abdominal pain, heartburn, or change in bowel habits   male: negative for dysuria, hematuria, decreased urinary stream, erectile dysfunction, urethral discharge  MUSCULOSKELETAL: NEGATIVE for significant arthralgias or myalgia  NEURO: NEGATIVE for weakness, dizziness or paresthesias  PSYCHIATRIC: NEGATIVE for changes in mood or affect    OBJECTIVE:   /78 (BP Location: Right arm, Patient Position: Chair, Cuff Size: Adult Large)   Pulse 60   Temp 97.5  F (36.4  C) (Temporal)   Resp 18   Ht 1.803 m (5' 11\")   Wt 83.5 kg (184 lb)   SpO2 98%   BMI 25.66 kg/m      Physical Exam  GENERAL: healthy, alert and no distress  EYES: Eyes grossly normal to inspection, PERRL and conjunctivae and sclerae normal  HENT: ear canals and TM's normal, nose and mouth without ulcers or lesions  NECK: no adenopathy, no asymmetry, masses, or scars and thyroid normal to palpation  RESP: lungs clear to auscultation - no rales, rhonchi or wheezes  CV: regular rate and rhythm, normal S1 S2, no S3 or S4, no murmur, click or rub, no peripheral edema and peripheral pulses strong  ABDOMEN: soft, nontender, no hepatosplenomegaly, no masses and bowel sounds normal  MS: no gross musculoskeletal defects noted, no edema  SKIN: no suspicious lesions or rashes  NEURO: Normal strength and tone, mentation intact and speech normal  PSYCH: mentation appears normal, affect " "normal/bright        ASSESSMENT/PLAN:       ICD-10-CM    1. Encounter for preventive care   Age and gender appropriate preventive care and screenings are discussed.  Particular attention to personal preventive care and age appropriate lifestyle including the incorporation of healthy diet and physical activity is made.     Z00.00 CBC with platelets and differential     TSH with free T4 reflex     PSA, screen     Hepatitis C Screen Reflex to HCV RNA Quant and Genotype   2. Chronic bilateral low back pain without sciatica   Since last summer.  Refer to physiatry.  He's interested in dry needling, potentially acupuncture.   M54.50     G89.29    3. Screen for colon cancer   With Fhx Colon CA.  Referral entered.   Z12.11 Adult Gastro Ref - Procedure Only   4. Screening for HIV (human immunodeficiency virus)  Z11.4 HIV Antigen Antibody Combo   5. Coronary artery disease involving native coronary artery of native heart without angina pectoris   Medications noted. Followed by Cards. No changes made. Last labs and note 2/2022 reviewed.   I25.10 nitroGLYcerin (NITROSTAT) 0.4 MG sublingual tablet   6. Benign colon polyp   As above K63.5    7. Bilateral low back pain without sciatica, unspecified chronicity  M54.50 Orthopedic  Referral           COUNSELING:   Reviewed preventive health counseling, as reflected in patient instructions    Estimated body mass index is 25.66 kg/m  as calculated from the following:    Height as of this encounter: 1.803 m (5' 11\").    Weight as of this encounter: 83.5 kg (184 lb).         He reports that he quit smoking about 2 years ago. His smoking use included cigarettes. He has a 3.00 pack-year smoking history. He has never used smokeless tobacco.      Counseling Resources:  ATP IV Guidelines  Pooled Cohorts Equation Calculator  FRAX Risk Assessment  ICSI Preventive Guidelines  Dietary Guidelines for Americans, 2010  USDA's MyPlate  ASA Prophylaxis  Lung CA Screening    Jens Frazier, " MD [facility]

## 2022-04-08 ENCOUNTER — OFFICE VISIT (OUTPATIENT)
Dept: FAMILY MEDICINE | Facility: CLINIC | Age: 57
End: 2022-04-08
Payer: COMMERCIAL

## 2022-04-08 VITALS
WEIGHT: 184 LBS | RESPIRATION RATE: 18 BRPM | DIASTOLIC BLOOD PRESSURE: 78 MMHG | OXYGEN SATURATION: 98 % | SYSTOLIC BLOOD PRESSURE: 126 MMHG | HEART RATE: 60 BPM | TEMPERATURE: 97.5 F | HEIGHT: 71 IN | BODY MASS INDEX: 25.76 KG/M2

## 2022-04-08 DIAGNOSIS — M54.50 CHRONIC BILATERAL LOW BACK PAIN WITHOUT SCIATICA: ICD-10-CM

## 2022-04-08 DIAGNOSIS — Z11.4 SCREENING FOR HIV (HUMAN IMMUNODEFICIENCY VIRUS): ICD-10-CM

## 2022-04-08 DIAGNOSIS — E78.5 HYPERLIPIDEMIA LDL GOAL <70: ICD-10-CM

## 2022-04-08 DIAGNOSIS — I25.10 CORONARY ARTERY DISEASE INVOLVING NATIVE CORONARY ARTERY OF NATIVE HEART WITHOUT ANGINA PECTORIS: ICD-10-CM

## 2022-04-08 DIAGNOSIS — G89.29 CHRONIC BILATERAL LOW BACK PAIN WITHOUT SCIATICA: ICD-10-CM

## 2022-04-08 DIAGNOSIS — Z12.11 SCREEN FOR COLON CANCER: ICD-10-CM

## 2022-04-08 DIAGNOSIS — Z00.00 ENCOUNTER FOR PREVENTIVE CARE: Primary | ICD-10-CM

## 2022-04-08 DIAGNOSIS — K63.5 BENIGN COLON POLYP: ICD-10-CM

## 2022-04-08 LAB
BASOPHILS # BLD AUTO: 0.1 10E3/UL (ref 0–0.2)
BASOPHILS NFR BLD AUTO: 1 %
EOSINOPHIL # BLD AUTO: 0.2 10E3/UL (ref 0–0.7)
EOSINOPHIL NFR BLD AUTO: 3 %
ERYTHROCYTE [DISTWIDTH] IN BLOOD BY AUTOMATED COUNT: 13.1 % (ref 10–15)
HCT VFR BLD AUTO: 47.3 % (ref 40–53)
HCV AB SERPL QL IA: NONREACTIVE
HGB BLD-MCNC: 16.1 G/DL (ref 13.3–17.7)
HIV 1+2 AB+HIV1 P24 AG SERPL QL IA: NONREACTIVE
LYMPHOCYTES # BLD AUTO: 2.6 10E3/UL (ref 0.8–5.3)
LYMPHOCYTES NFR BLD AUTO: 35 %
MCH RBC QN AUTO: 32.5 PG (ref 26.5–33)
MCHC RBC AUTO-ENTMCNC: 34 G/DL (ref 31.5–36.5)
MCV RBC AUTO: 95 FL (ref 78–100)
MONOCYTES # BLD AUTO: 0.8 10E3/UL (ref 0–1.3)
MONOCYTES NFR BLD AUTO: 11 %
NEUTROPHILS # BLD AUTO: 3.7 10E3/UL (ref 1.6–8.3)
NEUTROPHILS NFR BLD AUTO: 50 %
PLATELET # BLD AUTO: 221 10E3/UL (ref 150–450)
RBC # BLD AUTO: 4.96 10E6/UL (ref 4.4–5.9)
WBC # BLD AUTO: 7.5 10E3/UL (ref 4–11)

## 2022-04-08 PROCEDURE — 36415 COLL VENOUS BLD VENIPUNCTURE: CPT | Performed by: INTERNAL MEDICINE

## 2022-04-08 PROCEDURE — 84443 ASSAY THYROID STIM HORMONE: CPT | Performed by: INTERNAL MEDICINE

## 2022-04-08 PROCEDURE — 85025 COMPLETE CBC W/AUTO DIFF WBC: CPT | Performed by: INTERNAL MEDICINE

## 2022-04-08 PROCEDURE — 99386 PREV VISIT NEW AGE 40-64: CPT | Mod: 25 | Performed by: INTERNAL MEDICINE

## 2022-04-08 PROCEDURE — G0103 PSA SCREENING: HCPCS | Performed by: INTERNAL MEDICINE

## 2022-04-08 PROCEDURE — 99213 OFFICE O/P EST LOW 20 MIN: CPT | Mod: 25 | Performed by: INTERNAL MEDICINE

## 2022-04-08 PROCEDURE — 87389 HIV-1 AG W/HIV-1&-2 AB AG IA: CPT | Performed by: INTERNAL MEDICINE

## 2022-04-08 PROCEDURE — 86803 HEPATITIS C AB TEST: CPT | Performed by: INTERNAL MEDICINE

## 2022-04-08 PROCEDURE — 80061 LIPID PANEL: CPT | Performed by: INTERNAL MEDICINE

## 2022-04-08 RX ORDER — NITROGLYCERIN 0.4 MG/1
TABLET SUBLINGUAL
Qty: 25 TABLET | Refills: 0 | Status: SHIPPED | OUTPATIENT
Start: 2022-04-08

## 2022-04-09 LAB
CHOLEST SERPL-MCNC: 165 MG/DL
FASTING STATUS PATIENT QL REPORTED: NO
HDLC SERPL-MCNC: 54 MG/DL
LDLC SERPL CALC-MCNC: 79 MG/DL
NONHDLC SERPL-MCNC: 111 MG/DL
PSA SERPL-MCNC: 1.31 UG/L (ref 0–4)
TRIGL SERPL-MCNC: 158 MG/DL
TSH SERPL DL<=0.005 MIU/L-ACNC: 2.06 MU/L (ref 0.4–4)

## 2022-04-14 ENCOUNTER — TELEPHONE (OUTPATIENT)
Dept: GASTROENTEROLOGY | Facility: CLINIC | Age: 57
End: 2022-04-14
Payer: COMMERCIAL

## 2022-04-14 DIAGNOSIS — Z11.59 ENCOUNTER FOR SCREENING FOR OTHER VIRAL DISEASES: Primary | ICD-10-CM

## 2022-04-14 NOTE — TELEPHONE ENCOUNTER
Screening Questions  BlueKIND OF PREP RedLOCATION [review exclusion criteria] GreenSEDATION TYPE  1. Have you had a positive covid test in the last 90 days? n     2. Do you have a legal guardian or medical Power of ?  Are you able to give consent for your medical care?Y (Sedation review/consideration needed)    3. Are you active on mychart? Y    4. What insurance is in the chart? PREFERRED ONE     3.   Ordering/Referring Provider: Jens Frazier MD in  FAMILY PRAC/IM    4. BMI 25.0 [BMI OVER 40-EXTENDED PREP]  If greater than 40 review exclusion criteria [PAC APPT IF @ [facility]]        5.  Respiratory Screening :  [If yes to any of the following HOSPITAL setting only]     Do you use daily home oxygen? N    Do you have mod to severe Obstructive Sleep Apnea? N [OKAY @ [facility] SH PH RI]   Do you have Pulmonary Hypertension? N     Do you have UNCONTROLLED asthma? N        6.   Have you had a heart or lung transplant? N      7.   Are you currently on dialysis? N [ If yes, G-PREP & HOSPITAL setting only]     8.   Do you have chronic kidney disease? N [ If yes, G-PREP ]    9.   Have you had a stroke or Transient ischemic attack (TIA - aka  mini stroke ) within 6 months?  N (If yes, please review exclusion criteria)    10.   In the past 6 months, have you had any heart related issues including cardiomyopathy or heart attack? N           If yes, did it require cardiac stenting or other implantable device? N      11.   Do you have any implantable devices in your body (pacemaker, defib, LVAD)? N (If yes, please review exclusion criteria)    12.   Do you take nitroglycerin? N           If yes, how often? N  (if yes, HOSPITAL setting ONLY)    13.   Are you currently taking any blood thinners? N           [IF YES, INFORM PATIENT TO FOLLOW UP W/ ORDERING PROVIDER FOR BRIDGING INSTRUCTIONS]     14.   Do you have a diagnosis of diabetes? N   [ If yes, G-PREP ]    15.   [FEMALES] Are you currently pregnant?     If yes,  how many weeks?     16.   Are you taking any prescription pain medications on a routine schedule?  N  [ If yes, EXTENDED PREP.] [If yes, MAC]    17.   Do you have any chemical dependencies such as alcohol, street drugs, or methadone?  N [If yes, MAC]    18.   Do you have any history of post-traumatic stress syndrome, severe anxiety or history of psychosis?  N  [If yes, MAC]    19.   Do you transfer independently?  Y    20.  On a regular basis do you go 3-5 days between bowel movements? N   [ If yes, EXTENDED PREP.]    21.   Preferred LOCAL Pharmacy for Pre Prescription      [facility][id number] [address] [place] [address] PHARMACY #[id number] [facility] [address] [facility] [address] [facility] [address] [place] [address] N.      Scheduling Details      Caller : Matty  (Please ask for phone number if not scheduled by patient)    Type of Procedure Scheduled: colonoscopy  Which Colonoscopy Prep was Sent?: miralax  LUIS CF PATIENTS & GROEN'S PATIENTS NEEDS EXTENDED PREP  Surgeon: SCOTTY Mauricio  Date of Procedure: 5/16  Location:       Sedation Type: cs  Conscious Sedation- Needs  for 6 hours after the procedure  MAC/General-Needs  for 24 hours after procedure    Pre-op Required at [facility] [place], [place] and OR for MAC sedation: n  (advise patient they will need a pre-op prior to procedure -)      Informed patient they will need an adult  y  Cannot take any type of public or medical transportation alone    Pre-Procedure Covid test to be completed at [facility] or Externally: externally in [place]    Confirmed Nurse will call to complete assessment y    Additional comments: none

## 2022-04-27 DIAGNOSIS — I10 BENIGN ESSENTIAL HYPERTENSION: ICD-10-CM

## 2022-04-27 DIAGNOSIS — I25.10 CORONARY ARTERY DISEASE INVOLVING NATIVE CORONARY ARTERY OF NATIVE HEART WITHOUT ANGINA PECTORIS: ICD-10-CM

## 2022-04-27 DIAGNOSIS — E78.5 HYPERLIPIDEMIA LDL GOAL <70: ICD-10-CM

## 2022-04-27 RX ORDER — LOSARTAN POTASSIUM 25 MG/1
25 TABLET ORAL DAILY
Qty: 90 TABLET | Refills: 3 | Status: SHIPPED | OUTPATIENT
Start: 2022-04-27

## 2022-04-27 RX ORDER — ROSUVASTATIN CALCIUM 40 MG/1
40 TABLET, COATED ORAL DAILY
Qty: 90 TABLET | Refills: 3 | Status: SHIPPED | OUTPATIENT
Start: 2022-04-27

## 2022-04-27 RX ORDER — METOPROLOL SUCCINATE 50 MG/1
50 TABLET, EXTENDED RELEASE ORAL DAILY
Qty: 90 TABLET | Refills: 3 | Status: SHIPPED | OUTPATIENT
Start: 2022-04-27

## 2022-05-11 ENCOUNTER — MYC MEDICAL ADVICE (OUTPATIENT)
Dept: CALL CENTER | Age: 57
End: 2022-05-11
Payer: COMMERCIAL

## 2022-05-16 ENCOUNTER — HOSPITAL ENCOUNTER (OUTPATIENT)
Facility: CLINIC | Age: 57
Discharge: HOME | End: 2022-05-16
Attending: INTERNAL MEDICINE | Admitting: INTERNAL MEDICINE
Payer: COMMERCIAL

## 2022-05-16 VITALS
WEIGHT: 182 LBS | RESPIRATION RATE: 14 BRPM | SYSTOLIC BLOOD PRESSURE: 136 MMHG | DIASTOLIC BLOOD PRESSURE: 85 MMHG | HEIGHT: 72 IN | BODY MASS INDEX: 24.65 KG/M2 | OXYGEN SATURATION: 97 % | HEART RATE: 52 BPM

## 2022-05-16 DIAGNOSIS — Z12.11 ENCOUNTER FOR SCREENING COLONOSCOPY: Primary | ICD-10-CM

## 2022-05-16 LAB — COLONOSCOPY: NORMAL

## 2022-05-16 PROCEDURE — G0500 MOD SEDAT ENDO SERVICE >5YRS: HCPCS | Performed by: INTERNAL MEDICINE

## 2022-05-16 PROCEDURE — 45378 DIAGNOSTIC COLONOSCOPY: CPT | Performed by: INTERNAL MEDICINE

## 2022-05-16 PROCEDURE — 250N000011 HC RX IP 250 OP 636: Performed by: INTERNAL MEDICINE

## 2022-05-16 PROCEDURE — G0105 COLORECTAL SCRN; HI RISK IND: HCPCS | Performed by: INTERNAL MEDICINE

## 2022-05-16 PROCEDURE — 99153 MOD SED SAME PHYS/QHP EA: CPT

## 2022-05-16 RX ORDER — ONDANSETRON 2 MG/ML
4 INJECTION INTRAMUSCULAR; INTRAVENOUS
Status: CANCELLED | OUTPATIENT
Start: 2022-05-16

## 2022-05-16 RX ORDER — LIDOCAINE 40 MG/G
CREAM TOPICAL
Status: CANCELLED | OUTPATIENT
Start: 2022-05-16

## 2022-05-16 RX ORDER — SODIUM CHLORIDE, SODIUM LACTATE, POTASSIUM CHLORIDE, CALCIUM CHLORIDE 600; 310; 30; 20 MG/100ML; MG/100ML; MG/100ML; MG/100ML
INJECTION, SOLUTION INTRAVENOUS CONTINUOUS
Status: CANCELLED | OUTPATIENT
Start: 2022-05-16

## 2022-05-16 RX ORDER — FENTANYL CITRATE 50 UG/ML
INJECTION, SOLUTION INTRAMUSCULAR; INTRAVENOUS PRN
Status: DISCONTINUED | OUTPATIENT
Start: 2022-05-16 | End: 2022-05-16 | Stop reason: HOSPADM

## 2022-05-16 RX ADMIN — FENTANYL CITRATE 100 MCG: 50 INJECTION, SOLUTION INTRAMUSCULAR; INTRAVENOUS at 14:33

## 2022-05-16 RX ADMIN — MIDAZOLAM 1 MG: 1 INJECTION INTRAMUSCULAR; INTRAVENOUS at 14:35

## 2022-05-16 RX ADMIN — MIDAZOLAM 2 MG: 1 INJECTION INTRAMUSCULAR; INTRAVENOUS at 14:33

## 2022-05-16 NOTE — H&P
Matty Lacy  [id number]  male  57 year old      Reason for procedure/surgery: Screening for Colon Cancer    Patient Active Problem List   Diagnosis     STEMI (ST elevation myocardial infarction) (H)     Coronary artery disease involving native coronary artery of native heart without angina pectoris     Status post coronary angiogram     Benign essential hypertension     Mixed hyperlipidemia     Benign colon polyp       Past Surgical History:    Past Surgical History:   Procedure Laterality Date     CV HEART CATHETERIZATION WITH POSSIBLE INTERVENTION N/A 5/6/2019    Procedure: Heart Catheterization with possible Intervention;  Surgeon: Ashanti Epstein MD;  Location:  HEART CARDIAC CATH LAB     CV HEART CATHETERIZATION WITH POSSIBLE INTERVENTION N/A 5/22/2019    Procedure: Heart Catheterization with Possible Intervention;  Surgeon: Ashanti Epstein MD;  Location:  HEART CARDIAC CATH LAB     CV LEFT HEART CATH N/A 5/6/2019    Procedure: Left Heart Cath;  Surgeon: Ashanti Epstein MD;  Location:  HEART CARDIAC CATH LAB     CV PCI STENT DRUG ELUTING N/A 5/6/2019    Procedure: PCI Stent Drug Eluting;  Surgeon: Ashanti Epstein MD;  Location:  HEART CARDIAC CATH LAB     CV PCI STENT DRUG ELUTING N/A 5/22/2019    Procedure: PCI Stent Drug Eluting;  Surgeon: Ashanti Epstein MD;  Location:  HEART CARDIAC CATH LAB       Past Medical History:   Past Medical History:   Diagnosis Date     Benign essential hypertension 2/1/2021     Mixed hyperlipidemia 2/1/2021     Myocardial infarction (H)        Social History:   Social History     Tobacco Use     Smoking status: Former Smoker     Packs/day: 0.50     Years: 6.00     Pack years: 3.00     Types: Cigarettes     Quit date: 5/5/2019     Years since quitting: 3.0     Smokeless tobacco: Never Used   Substance Use Topics     Alcohol use: Yes     Comment: couple beers monthly       Family History:   Family History   Problem Relation Age  of Onset     Coronary Artery Disease Maternal Grandfather        Allergies:   Allergies   Allergen Reactions     No Known Allergies        Active Medications:   No current outpatient medications on file.       Systemic Review:   CONSTITUTIONAL: NEGATIVE for fever, chills, change in weight  ENT/MOUTH: NEGATIVE for ear, mouth and throat problems  RESP: NEGATIVE for significant cough or SOB  CV: NEGATIVE for chest pain, palpitations or peripheral edema    Physical Examination:   Vital Signs: There were no vitals taken for this visit.  GENERAL: healthy, alert and no distress  NECK: no adenopathy, no asymmetry, masses, or scars  RESP: lungs clear to auscultation - no rales, rhonchi or wheezes  CV: regular rate and rhythm, normal S1 S2, no S3 or S4, no murmur, click or rub, no peripheral edema and peripheral pulses strong  ABDOMEN: soft, nontender, no hepatosplenomegaly, no masses and bowel sounds normal  MS: no gross musculoskeletal defects noted, no edema    ASA: 2    Mallampati Score: 2    Plan: Appropriate to proceed as scheduled.      Francisco Mauricio MD  5/16/2022    PCP:  Jens Frazier

## 2022-06-20 ENCOUNTER — OFFICE VISIT (OUTPATIENT)
Dept: NEUROSURGERY | Facility: CLINIC | Age: 57
End: 2022-06-20
Attending: INTERNAL MEDICINE
Payer: COMMERCIAL

## 2022-06-20 VITALS — SYSTOLIC BLOOD PRESSURE: 129 MMHG | DIASTOLIC BLOOD PRESSURE: 87 MMHG | OXYGEN SATURATION: 98 % | HEART RATE: 71 BPM

## 2022-06-20 DIAGNOSIS — G89.29 CHRONIC BILATERAL LOW BACK PAIN WITHOUT SCIATICA: Primary | ICD-10-CM

## 2022-06-20 DIAGNOSIS — M54.50 CHRONIC BILATERAL LOW BACK PAIN WITHOUT SCIATICA: Primary | ICD-10-CM

## 2022-06-20 PROCEDURE — 99203 OFFICE O/P NEW LOW 30 MIN: CPT | Performed by: NURSE PRACTITIONER

## 2022-06-20 ASSESSMENT — PAIN SCALES - GENERAL: PAINLEVEL: SEVERE PAIN (7)

## 2022-06-20 NOTE — LETTER
6/20/2022         RE: Matty Lacy  [address]  [address]  [facility] [address]        Dear Colleague,    Thank you for referring your patient, Matty Lacy, to the [facility] NEUROLOGY CLINICS [facility]. Please see a copy of my visit note below.    Dr. Coleman Kim   [facility] Neurosurgery Clinic Visit      CC: low back pain     Primary Care Provider: Jens Frazier    Reason For Visit:   I was asked by Dr. Frazier to consult on the patient for: bilateral low back pain without sciatica       HPI: Matty Lacy is a 57 year old male who presents for evaluation of chronic bilateral low back pain. Symptoms started in 2020 after a fall playing volleyball, improved over time. Pain returned in June 2021 after heavy lifting, improved with heat pads. He reports pain increased again 6 months ago and continues to be bothersome. Today, patient reports bilateral low back pain that radiates to bilateral buttocks and hips. He has intermittent numbness at lateral aspect of bilateral knees. Pain is worsened with bending and heavy lifting. He has tried rest and home exercises for pain control. Denies any weakness, falls, foot drop, saddle anesthesia, or bladder/bowel incontinence.     Current pain: 7/10     Past Medical History:   Diagnosis Date     Benign essential hypertension 2/1/2021     Mixed hyperlipidemia 2/1/2021     Myocardial infarction (H)        Past Medical History reviewed with patient during visit.    Past Surgical History:   Procedure Laterality Date     COLONOSCOPY N/A 5/16/2022    Procedure: COLONOSCOPY;  Surgeon: Francisco Mauricio MD;  Location:  GI     CV HEART CATHETERIZATION WITH POSSIBLE INTERVENTION N/A 5/6/2019    Procedure: Heart Catheterization with possible Intervention;  Surgeon: Ashanti Epstein MD;  Location:  HEART CARDIAC CATH LAB     CV HEART CATHETERIZATION WITH POSSIBLE INTERVENTION N/A 5/22/2019    Procedure: Heart Catheterization with Possible  Intervention;  Surgeon: Ashanti Epstein MD;  Location:  HEART CARDIAC CATH LAB     CV LEFT HEART CATH N/A 5/6/2019    Procedure: Left Heart Cath;  Surgeon: Ashanti Epstein MD;  Location:  HEART CARDIAC CATH LAB     CV PCI STENT DRUG ELUTING N/A 5/6/2019    Procedure: PCI Stent Drug Eluting;  Surgeon: Ashanti Epstein MD;  Location:  HEART CARDIAC CATH LAB     CV PCI STENT DRUG ELUTING N/A 5/22/2019    Procedure: PCI Stent Drug Eluting;  Surgeon: Ashanti Epstein MD;  Location:  HEART CARDIAC CATH LAB     Past Surgical History reviewed with patient during visit.    Current Outpatient Medications   Medication     aspirin (ASA) 81 MG EC tablet     losartan (COZAAR) 25 MG tablet     metoprolol succinate ER (TOPROL-XL) 50 MG 24 hr tablet     nitroGLYcerin (NITROSTAT) 0.4 MG sublingual tablet     rosuvastatin (CRESTOR) 40 MG tablet     No current facility-administered medications for this visit.       Allergies   Allergen Reactions     No Known Allergies        Social History     Socioeconomic History     Marital status: Single   Tobacco Use     Smoking status: Former Smoker     Packs/day: 0.50     Years: 6.00     Pack years: 3.00     Types: Cigarettes     Quit date: 5/5/2019     Years since quitting: 3.1     Smokeless tobacco: Never Used   Substance and Sexual Activity     Alcohol use: Yes     Comment: couple beers monthly     Drug use: Never       Family History   Problem Relation Age of Onset     Coronary Artery Disease Maternal Grandfather        ROS: 10 point ROS neg other than the symptoms noted above in the HPI.    Vital Signs: /87   Pulse 71   SpO2 98%     Physical Examination:  Constitutional:  Alert, well nourished, NAD.  HEENT: Normocephalic, atraumatic.   Pulmonary:  Without shortness of breath, normal effort.   Cardiovascular:  No pitting edema of BLE.      Neurological:  Awake  Alert  Oriented x 3  Speech clear  Cranial nerves II - XII grossly  intact  PERRL  EOMI  Motor exam:  Iliopsoas  (hip flexion)               Right: 5/5  Left:  5/5  Quadriceps  (knee extension)       Right:  5/5  Left:  5/5  Hamstrings  (knee flexion)            Right:  5/5  Left:  5/5  Gastroc Soleus  (PF)                          Right:  5/5  Left:  5/5  Tibialis Ant  (DF)                          Right:  5/5  Left:  5/5  EHL                          Right:  5/5  Left:  5/5         Sensation normal to BLE    Reflexes are 2+ in the patellar and Achilles. There is no clonus.     Musculoskeletal:  Gait: Able to stand from a seated position. Normal non-antalgic, non-myelopathic gait.   No tenderness of the spine or paraspinous muscles.  Hip height is symmetrical.  Negative SI joint tenderness bilaterally.  Positive straight leg raise bilaterally.      Imaging:   No imaging has been completed of the lumbar spine     Assessment/Plan:   57 year old male who presents for evaluation of chronic bilateral low back pain that radiates to bilateral buttocks and hips. He has tried various conservative measures at home, but symptoms persist. No imaging has been completed of the lumbar spine. Recommend a lumbar spine MRI and referral for PT. Will call patient with MRI results and next steps.    Advised patient to call our clinic with any questions or concerns. Discussed red flag symptoms and advised to seek medical attention if these develop. Patient voiced understanding and agreement.      Angelica Dixon CNP  [facility] Neurosurgery  [address] [address]  Tel [phone]  Pager [phone]          Again, thank you for allowing me to participate in the care of your patient.        Sincerely,        Angelica Dixon NP

## 2022-06-20 NOTE — PATIENT INSTRUCTIONS
Order for lumbar spine MRI. You can stop at the  to schedule, or call [phone]. I will call with the results and next steps.     Referral to physical therapy.     Please call our clinic if symptoms persist, change, or worsen at any time.    [facility] Neurosurgery  [address] [address]  Tel [phone]

## 2022-06-20 NOTE — PROGRESS NOTES
Dr. Coleman Kim   [facility] Neurosurgery Clinic Visit      CC: low back pain     Primary Care Provider: Jens Frazier    Reason For Visit:   I was asked by Dr. Frazier to consult on the patient for: bilateral low back pain without sciatica       HPI: Matty Lacy is a 57 year old male who presents for evaluation of chronic bilateral low back pain. Symptoms started in 2020 after a fall playing volleyball, improved over time. Pain returned in June 2021 after heavy lifting, improved with heat pads. He reports pain increased again 6 months ago and continues to be bothersome. Today, patient reports bilateral low back pain that radiates to bilateral buttocks and hips. He has intermittent numbness at lateral aspect of bilateral knees. Pain is worsened with bending and heavy lifting. He has tried rest and home exercises for pain control. Denies any weakness, falls, foot drop, saddle anesthesia, or bladder/bowel incontinence.     Current pain: 7/10     Past Medical History:   Diagnosis Date     Benign essential hypertension 2/1/2021     Mixed hyperlipidemia 2/1/2021     Myocardial infarction (H)        Past Medical History reviewed with patient during visit.    Past Surgical History:   Procedure Laterality Date     COLONOSCOPY N/A 5/16/2022    Procedure: COLONOSCOPY;  Surgeon: Francisco Mauricio MD;  Location:  GI     CV HEART CATHETERIZATION WITH POSSIBLE INTERVENTION N/A 5/6/2019    Procedure: Heart Catheterization with possible Intervention;  Surgeon: Ashanti Epstein MD;  Location:  HEART CARDIAC CATH LAB     CV HEART CATHETERIZATION WITH POSSIBLE INTERVENTION N/A 5/22/2019    Procedure: Heart Catheterization with Possible Intervention;  Surgeon: Ashanti Epstein MD;  Location:  HEART CARDIAC CATH LAB     CV LEFT HEART CATH N/A 5/6/2019    Procedure: Left Heart Cath;  Surgeon: Ashanti Epstein MD;  Location:  HEART CARDIAC CATH LAB     CV PCI STENT DRUG ELUTING N/A 5/6/2019     Procedure: PCI Stent Drug Eluting;  Surgeon: Ashanti Epstein MD;  Location: [facility] CARDIAC CATH LAB     CV PCI STENT DRUG ELUTING N/A 5/22/2019    Procedure: PCI Stent Drug Eluting;  Surgeon: Ashanti Epstein MD;  Location: [facility] CARDIAC CATH LAB     Past Surgical History reviewed with patient during visit.    Current Outpatient Medications   Medication     aspirin (ASA) 81 MG EC tablet     losartan (COZAAR) 25 MG tablet     metoprolol succinate ER (TOPROL-XL) 50 MG 24 hr tablet     nitroGLYcerin (NITROSTAT) 0.4 MG sublingual tablet     rosuvastatin (CRESTOR) 40 MG tablet     No current facility-administered medications for this visit.       Allergies   Allergen Reactions     No Known Allergies        Social History     Socioeconomic History     Marital status: Single   Tobacco Use     Smoking status: Former Smoker     Packs/day: 0.50     Years: 6.00     Pack years: 3.00     Types: Cigarettes     Quit date: 5/5/2019     Years since quitting: 3.1     Smokeless tobacco: Never Used   Substance and Sexual Activity     Alcohol use: Yes     Comment: couple beers monthly     Drug use: Never       Family History   Problem Relation Age of Onset     Coronary Artery Disease Maternal Grandfather        ROS: 10 point ROS neg other than the symptoms noted above in the HPI.    Vital Signs: /87   Pulse 71   SpO2 98%     Physical Examination:  Constitutional:  Alert, well nourished, NAD.  HEENT: Normocephalic, atraumatic.   Pulmonary:  Without shortness of breath, normal effort.   Cardiovascular:  No pitting edema of BLE.      Neurological:  Awake  Alert  Oriented x 3  Speech clear  Cranial nerves II - XII grossly intact  PERRL  EOMI  Motor exam:  Iliopsoas  (hip flexion)               Right: 5/5  Left:  5/5  Quadriceps  (knee extension)       Right:  5/5  Left:  5/5  Hamstrings  (knee flexion)            Right:  5/5  Left:  5/5  Gastroc Soleus  (PF)                          Right:  5/5  Left:   5/5  Tibialis Ant  (DF)                          Right:  5/5  Left:  5/5  EHL                          Right:  5/5  Left:  5/5         Sensation normal to BLE    Reflexes are 2+ in the patellar and Achilles. There is no clonus.     Musculoskeletal:  Gait: Able to stand from a seated position. Normal non-antalgic, non-myelopathic gait.   No tenderness of the spine or paraspinous muscles.  Hip height is symmetrical.  Negative SI joint tenderness bilaterally.  Positive straight leg raise bilaterally.      Imaging:   No imaging has been completed of the lumbar spine     Assessment/Plan:   57 year old male who presents for evaluation of chronic bilateral low back pain that radiates to bilateral buttocks and hips. He has tried various conservative measures at home, but symptoms persist. No imaging has been completed of the lumbar spine. Recommend a lumbar spine MRI and referral for PT. Will call patient with MRI results and next steps.    Advised patient to call our clinic with any questions or concerns. Discussed red flag symptoms and advised to seek medical attention if these develop. Patient voiced understanding and agreement.      Angelica Dixon CNP  [facility] Neurosurgery  [address] [address]  Tel [phone]  Pager [phone]

## 2022-06-20 NOTE — NURSING NOTE
Matty Lacy is a 57 year old male who presents for:  Chief Complaint   Patient presents with     Neurologic Problem     Low Back Pain        Initial Vitals:  /87   Pulse 71   SpO2 98%  Estimated body mass index is 24.68 kg/m  as calculated from the following:    Height as of 5/16/22: 6' (1.829 m).    Weight as of 5/16/22: 182 lb (82.6 kg).. There is no height or weight on file to calculate BSA. BP completed using cuff size: large  Severe Pain (7)    Nursing Comments: 7/10 pain. Tightness in LBP. Sharp pain to L hip. FLOR knee numbness.    Erick Rios MA

## 2022-06-24 ENCOUNTER — ANCILLARY PROCEDURE (OUTPATIENT)
Dept: MRI IMAGING | Facility: CLINIC | Age: 57
End: 2022-06-24
Attending: NURSE PRACTITIONER
Payer: COMMERCIAL

## 2022-06-24 DIAGNOSIS — G89.29 CHRONIC BILATERAL LOW BACK PAIN WITHOUT SCIATICA: ICD-10-CM

## 2022-06-24 DIAGNOSIS — M54.50 CHRONIC BILATERAL LOW BACK PAIN WITHOUT SCIATICA: ICD-10-CM

## 2022-06-24 PROCEDURE — 72148 MRI LUMBAR SPINE W/O DYE: CPT

## 2022-06-27 ENCOUNTER — TELEPHONE (OUTPATIENT)
Dept: NEUROSURGERY | Facility: CLINIC | Age: 57
End: 2022-06-27

## 2022-06-27 DIAGNOSIS — M54.50 CHRONIC BILATERAL LOW BACK PAIN WITHOUT SCIATICA: Primary | ICD-10-CM

## 2022-06-27 DIAGNOSIS — G89.29 CHRONIC BILATERAL LOW BACK PAIN WITHOUT SCIATICA: Primary | ICD-10-CM

## 2022-06-27 NOTE — TELEPHONE ENCOUNTER
Reviewed recent lumbar spine MRI.  Recommend referral to [facility] for MBB/RFA, continue with PT as well. LVM with patient, advised to call back to discuss.     EXAM: MR LUMBAR SPINE W/O CONTRAST  LOCATION: [facility]  DATE/TIME: 6/24/2022 10:25 AM  IMPRESSION:  1.  Multilevel lumbar spondylosis.  2.  Small left extraforaminal disc protrusion at L3-L4 contacts the exiting left L3 spinal nerve.  3.  Mild neural foraminal stenosis on the left at L3-L4 and bilaterally at L5-S1.  4.  Mild spinal canal stenosis at L3-L4.  5.  Normal variant transitional lumbosacral junction anatomy. Recommend close correlation with plain films prior to any intervention.

## 2022-06-28 NOTE — TELEPHONE ENCOUNTER
Angelica Dixon CNP tried calling the patient x2, no answer and no VM set up.     Angelica states that lumbar spine MRI which shows lumbar spondylosis and multilevel facet arthropathy. Recommend referral to [facility] for MBB/RFA, continue with PT as well    Patient also sent [employer] message to update him of reccomendations

## 2022-06-28 NOTE — TELEPHONE ENCOUNTER
Reviewed recent lumbar spine MRI which shows lumbar spondylosis and multilevel facet arthropathy. Recommend referral to [facility] for MBB/RFA, continue with PT as well. Called patient x 2 but no answer or voicemail available. Will send message to care team to try and contact patient with results and recommendations.     EXAM: MR LUMBAR SPINE W/O CONTRAST  LOCATION: [facility]  DATE/TIME: 6/24/2022 10:25 AM  IMPRESSION:  1.  Multilevel lumbar spondylosis.  2.  Small left extraforaminal disc protrusion at L3-L4 contacts the exiting left L3 spinal nerve.  3.  Mild neural foraminal stenosis on the left at L3-L4 and bilaterally at L5-S1.  4.  Mild spinal canal stenosis at L3-L4.  5.  Normal variant transitional lumbosacral junction anatomy. Recommend close correlation with plain films prior to any intervention.

## 2022-06-29 ENCOUNTER — TELEPHONE (OUTPATIENT)
Dept: NEUROSURGERY | Facility: CLINIC | Age: 57
End: 2022-06-29

## 2022-06-29 DIAGNOSIS — G89.29 CHRONIC BILATERAL LOW BACK PAIN WITHOUT SCIATICA: Primary | ICD-10-CM

## 2022-06-29 DIAGNOSIS — M54.50 CHRONIC BILATERAL LOW BACK PAIN WITHOUT SCIATICA: Primary | ICD-10-CM

## 2022-06-29 NOTE — TELEPHONE ENCOUNTER
Patient called received MRI results and treatment plan. He has questions on the treatment plan. Patient spoke with nursing who was unable to answer his questions. Nursing advised to call to schedule an appt, first available phone visit is 718/22. He said he needs to speak to someone before 7/18 as he is having another procedure prior to 7/18 . Please contact patient [phone]

## 2022-06-29 NOTE — TELEPHONE ENCOUNTER
Placed call back to patient -    Patient stating that he does not understand why he was referred to MBB/RFA rather than other options, based on his MRI report. Patient has very specific questions/concerns regarding his MRI results that nursing can not asnwer.    Pt encouraged to call and set up phone visit with Angelica Dixon CNP to discuss his options.

## 2022-06-30 NOTE — TELEPHONE ENCOUNTER
Returned call to patient. Reviewed recent lumbar spine MRI which shows lumbar spondylosis and multilevel facet arthropathy. Recommend referral to [facility] for MBB/RFA, continue with PT as well. Discussed with patient. He is interested in JACQUELINE instead and would like to discuss JACQUELINE vs MBB/RFA with [facility]. Placed referral. Advised patient to call clinic if symptoms persist, change, or worsen.      EXAM: MR LUMBAR SPINE W/O CONTRAST  LOCATION: [facility]  DATE/TIME: 6/24/2022 10:25 AM  IMPRESSION:  1.  Multilevel lumbar spondylosis.  2.  Small left extraforaminal disc protrusion at L3-L4 contacts the exiting left L3 spinal nerve.  3.  Mild neural foraminal stenosis on the left at L3-L4 and bilaterally at L5-S1.  4.  Mild spinal canal stenosis at L3-L4.  5.  Normal variant transitional lumbosacral junction anatomy. Recommend close correlation with plain films prior to any intervention.

## 2022-08-15 ENCOUNTER — THERAPY VISIT (OUTPATIENT)
Dept: PHYSICAL THERAPY | Facility: CLINIC | Age: 57
End: 2022-08-15
Attending: NURSE PRACTITIONER
Payer: COMMERCIAL

## 2022-08-15 DIAGNOSIS — M54.50 BILATERAL LOW BACK PAIN WITHOUT SCIATICA: ICD-10-CM

## 2022-08-15 DIAGNOSIS — G89.29 CHRONIC BILATERAL LOW BACK PAIN WITHOUT SCIATICA: ICD-10-CM

## 2022-08-15 DIAGNOSIS — M54.50 CHRONIC BILATERAL LOW BACK PAIN WITHOUT SCIATICA: ICD-10-CM

## 2022-08-15 PROCEDURE — 97161 PT EVAL LOW COMPLEX 20 MIN: CPT | Mod: GP | Performed by: PHYSICAL THERAPIST

## 2022-08-15 PROCEDURE — 97110 THERAPEUTIC EXERCISES: CPT | Mod: GP | Performed by: PHYSICAL THERAPIST
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
## 2022-09-12 ENCOUNTER — THERAPY VISIT (OUTPATIENT)
Dept: PHYSICAL THERAPY | Facility: CLINIC | Age: 57
End: 2022-09-12
Payer: COMMERCIAL

## 2022-09-12 DIAGNOSIS — M54.50 BILATERAL LOW BACK PAIN WITHOUT SCIATICA: Primary | ICD-10-CM

## 2022-09-12 PROCEDURE — 97110 THERAPEUTIC EXERCISES: CPT | Mod: GP | Performed by: PHYSICAL THERAPIST

## 2022-09-12 PROCEDURE — 97140 MANUAL THERAPY 1/> REGIONS: CPT | Mod: GP | Performed by: PHYSICAL THERAPIST

## 2022-09-26 ENCOUNTER — THERAPY VISIT (OUTPATIENT)
Dept: PHYSICAL THERAPY | Facility: CLINIC | Age: 57
End: 2022-09-26
Payer: COMMERCIAL

## 2022-09-26 DIAGNOSIS — M54.50 BILATERAL LOW BACK PAIN WITHOUT SCIATICA: Primary | ICD-10-CM

## 2022-09-26 PROCEDURE — 97140 MANUAL THERAPY 1/> REGIONS: CPT | Mod: GP | Performed by: PHYSICAL THERAPIST

## 2022-09-26 PROCEDURE — 97110 THERAPEUTIC EXERCISES: CPT | Mod: GP | Performed by: PHYSICAL THERAPIST

## 2022-10-10 ENCOUNTER — THERAPY VISIT (OUTPATIENT)
Dept: PHYSICAL THERAPY | Facility: CLINIC | Age: 57
End: 2022-10-10
Payer: COMMERCIAL

## 2022-10-10 DIAGNOSIS — M54.50 BILATERAL LOW BACK PAIN WITHOUT SCIATICA: Primary | ICD-10-CM

## 2022-10-10 PROCEDURE — 97110 THERAPEUTIC EXERCISES: CPT | Mod: GP | Performed by: PHYSICAL THERAPIST

## 2022-10-10 PROCEDURE — 97140 MANUAL THERAPY 1/> REGIONS: CPT | Mod: GP | Performed by: PHYSICAL THERAPIST

## 2022-10-30 ENCOUNTER — HEALTH MAINTENANCE LETTER (OUTPATIENT)
Age: 57
End: 2022-10-30

## 2022-10-31 ENCOUNTER — THERAPY VISIT (OUTPATIENT)
Dept: PHYSICAL THERAPY | Facility: CLINIC | Age: 57
End: 2022-10-31
Payer: COMMERCIAL

## 2022-10-31 DIAGNOSIS — M54.50 BILATERAL LOW BACK PAIN WITHOUT SCIATICA: Primary | ICD-10-CM

## 2022-10-31 PROCEDURE — 97140 MANUAL THERAPY 1/> REGIONS: CPT | Mod: GP | Performed by: PHYSICAL THERAPIST

## 2022-10-31 PROCEDURE — 97110 THERAPEUTIC EXERCISES: CPT | Mod: GP | Performed by: PHYSICAL THERAPIST

## 2022-10-31 NOTE — PROGRESS NOTES
"DISCHARGE REPORT    Progress reporting period is from 8/15/2022 to 10/31/2022.   Matty has been seen in PT 5 times for treatment of LBP.    SUBJECTIVE   Subjective: much better.  Only having mild low back pain/stiffness \"here and there if I drive too long\".  Has to drive 5-7 hours often for work.    Current pain level is 0/10 Current Pain level: 0/10.       Initial Pain level: 6/10.   Changes in function:  Yes (See Goal flowsheet attached for changes in current functional level)  Adverse reaction to treatment or activity: None    OBJECTIVE  Changes noted in objective findings:    Objective: FIS limited to fingertips to knees, with HS complaint and LB complaint with return.   Excursion improves with reps.  Other lumbar movements are symptom free.  Patient can maintain prone plank 1 min.       ASSESSMENT/PLAN    STG/LTGs have been met or progress has been made towards goals:  Yes (See Goal flow sheet completed today.)  Assessment of Progress: The patient's condition is improving.  Self Management Plans:  Patient is independent in a home treatment program.    Matty continues to require the following intervention to meet STG and LTG's:  PT intervention is no longer required to meet STG/LTG.    Recommendations:  This patient is ready to be discharged from therapy and continue their home treatment program.    Please refer to the daily flowsheet for treatment today, total treatment time and time spent performing 1:1 timed codes.        "

## 2023-01-02 PROBLEM — M54.50 BILATERAL LOW BACK PAIN WITHOUT SCIATICA: Status: RESOLVED | Noted: 2022-08-15 | Resolved: 2023-01-02

## 2023-05-03 ENCOUNTER — TELEPHONE (OUTPATIENT)
Dept: CARDIOLOGY | Facility: CLINIC | Age: 58
End: 2023-05-03
Payer: COMMERCIAL

## 2023-05-03 NOTE — TELEPHONE ENCOUNTER
I received a fax requesting a refill of Cozaar from [facility]. Patient has appointment with [facility]. Please ask patient for his new Cardiologist to fill medications.

## 2023-06-01 ENCOUNTER — HEALTH MAINTENANCE LETTER (OUTPATIENT)
Age: 58
End: 2023-06-01

## 2024-06-15 ENCOUNTER — HEALTH MAINTENANCE LETTER (OUTPATIENT)
Age: 59
End: 2024-06-15

## 2025-07-06 ENCOUNTER — HEALTH MAINTENANCE LETTER (OUTPATIENT)
Age: 60
End: 2025-07-06

## (undated) DEVICE — CATH BALLOON NC EMERGE 2.25X8MM H7493926708220

## (undated) DEVICE — INFL DVC KIT W/10CC NITRO IN4530

## (undated) DEVICE — CATH ANGIO JUDKINS JL4 6FRX100CM INFINITI 534620T

## (undated) DEVICE — INTRO GLIDESHEATH SLENDER 6FR 10X45CM 60-1060

## (undated) DEVICE — CATH ANGIO JUDKINS JL3.5 6FRX100CM INFINITI 534618T

## (undated) DEVICE — GUIDEWIRE VASC 0.014INX180CM RUNTHROUGH 25-1011

## (undated) DEVICE — SLEEVE TR BAND RADIAL COMPRESSION DEVICE 24CM TRB24-REG

## (undated) DEVICE — CATH BALLOON EMERGE 2.5X15MM H7493918915250

## (undated) DEVICE — KIT HAND CONTROL ANGIOTOUCH ACIST 65CM AT-P65

## (undated) DEVICE — CATH BALLOON EMERGE 2.0X12MM H7493918912200

## (undated) DEVICE — DEFIB PRO-PADZ LVP LQD GEL ADULT 8900-2105-01

## (undated) DEVICE — CATH LAUNCHER 6FR JR 4.0 LA6JR40

## (undated) DEVICE — VALVE HEMOSTASIS .096" COPILOT MECH 1003331

## (undated) DEVICE — RAD G/W INQWIRE .035X260CM J-TIP EXCHANGE IQ35F260J1O5RS

## (undated) DEVICE — CATH GUIDING BLUE YELLOW PTFE XB4 6FRX100CM 67005600

## (undated) DEVICE — SYR ANGIOGRAPHY MULTIUSE KIT ACIST 014612

## (undated) DEVICE — GUIDEWIRE VASC 0.014IN DIA 185

## (undated) DEVICE — MANIFOLD KIT ANGIO AUTOMATED 014613

## (undated) DEVICE — CATH GUIDING BLUE YELLOW PTFE XB3.5 6FRX100CM 67005400

## (undated) DEVICE — WIRE GUIDE 0.035"X260CM SAFE-T-J EXCHANGE G00517

## (undated) DEVICE — CATH BALLOON NC EUPHORA 3.00X12MM NCEUP3012X

## (undated) DEVICE — CATH ANGIO JUDKINS R4 6FRX100CM INFINITI 534621T

## (undated) DEVICE — TOTE ANGIO CORP PC15AT SAN32CC83O

## (undated) RX ORDER — FENTANYL CITRATE 50 UG/ML
INJECTION, SOLUTION INTRAMUSCULAR; INTRAVENOUS
Status: DISPENSED
Start: 2019-05-22

## (undated) RX ORDER — VERAPAMIL HYDROCHLORIDE 2.5 MG/ML
INJECTION, SOLUTION INTRAVENOUS
Status: DISPENSED
Start: 2019-05-22

## (undated) RX ORDER — LIDOCAINE HYDROCHLORIDE 10 MG/ML
INJECTION, SOLUTION EPIDURAL; INFILTRATION; INTRACAUDAL; PERINEURAL
Status: DISPENSED
Start: 2019-05-06

## (undated) RX ORDER — ADENOSINE 3 MG/ML
INJECTION, SOLUTION INTRAVENOUS
Status: DISPENSED
Start: 2019-05-22

## (undated) RX ORDER — DIPHENHYDRAMINE HYDROCHLORIDE 50 MG/ML
INJECTION INTRAMUSCULAR; INTRAVENOUS
Status: DISPENSED
Start: 2022-05-16

## (undated) RX ORDER — HEPARIN SODIUM 1000 [USP'U]/ML
INJECTION, SOLUTION INTRAVENOUS; SUBCUTANEOUS
Status: DISPENSED
Start: 2019-05-06

## (undated) RX ORDER — LIDOCAINE HYDROCHLORIDE 10 MG/ML
INJECTION, SOLUTION EPIDURAL; INFILTRATION; INTRACAUDAL; PERINEURAL
Status: DISPENSED
Start: 2019-05-22

## (undated) RX ORDER — FENTANYL CITRATE 50 UG/ML
INJECTION, SOLUTION INTRAMUSCULAR; INTRAVENOUS
Status: DISPENSED
Start: 2022-05-16

## (undated) RX ORDER — FENTANYL CITRATE 50 UG/ML
INJECTION, SOLUTION INTRAMUSCULAR; INTRAVENOUS
Status: DISPENSED
Start: 2019-05-06

## (undated) RX ORDER — NITROGLYCERIN 5 MG/ML
VIAL (ML) INTRAVENOUS
Status: DISPENSED
Start: 2019-05-06

## (undated) RX ORDER — HEPARIN SODIUM 1000 [USP'U]/ML
INJECTION, SOLUTION INTRAVENOUS; SUBCUTANEOUS
Status: DISPENSED
Start: 2019-05-22

## (undated) RX ORDER — VERAPAMIL HYDROCHLORIDE 2.5 MG/ML
INJECTION, SOLUTION INTRAVENOUS
Status: DISPENSED
Start: 2019-05-06

## (undated) RX ORDER — HEPARIN SODIUM 200 [USP'U]/100ML
INJECTION, SOLUTION INTRAVENOUS
Status: DISPENSED
Start: 2019-05-22